# Patient Record
Sex: MALE | Race: WHITE | NOT HISPANIC OR LATINO | Employment: FULL TIME | URBAN - METROPOLITAN AREA
[De-identification: names, ages, dates, MRNs, and addresses within clinical notes are randomized per-mention and may not be internally consistent; named-entity substitution may affect disease eponyms.]

---

## 2019-02-06 ENCOUNTER — PATIENT MESSAGE (OUTPATIENT)
Dept: FAMILY MEDICINE | Facility: CLINIC | Age: 29
End: 2019-02-06

## 2019-02-06 ENCOUNTER — PATIENT MESSAGE (OUTPATIENT)
Dept: GASTROENTEROLOGY | Facility: CLINIC | Age: 29
End: 2019-02-06

## 2019-02-18 ENCOUNTER — LAB VISIT (OUTPATIENT)
Dept: LAB | Facility: HOSPITAL | Age: 29
End: 2019-02-18
Attending: NURSE PRACTITIONER
Payer: COMMERCIAL

## 2019-02-18 ENCOUNTER — OFFICE VISIT (OUTPATIENT)
Dept: GASTROENTEROLOGY | Facility: CLINIC | Age: 29
End: 2019-02-18
Payer: COMMERCIAL

## 2019-02-18 VITALS
SYSTOLIC BLOOD PRESSURE: 115 MMHG | WEIGHT: 144.19 LBS | DIASTOLIC BLOOD PRESSURE: 74 MMHG | HEART RATE: 64 BPM | BODY MASS INDEX: 23.17 KG/M2 | HEIGHT: 66 IN

## 2019-02-18 DIAGNOSIS — Z87.898 HISTORY OF ABDOMINAL PAIN: Primary | ICD-10-CM

## 2019-02-18 DIAGNOSIS — K51.00 PANCOLITIS: ICD-10-CM

## 2019-02-18 DIAGNOSIS — R10.9 ABDOMINAL PAIN, UNSPECIFIED ABDOMINAL LOCATION: ICD-10-CM

## 2019-02-18 PROCEDURE — 99203 PR OFFICE/OUTPT VISIT, NEW, LEVL III, 30-44 MIN: ICD-10-PCS | Mod: S$GLB,,, | Performed by: NURSE PRACTITIONER

## 2019-02-18 PROCEDURE — 99999 PR PBB SHADOW E&M-EST. PATIENT-LVL III: CPT | Mod: PBBFAC,,, | Performed by: NURSE PRACTITIONER

## 2019-02-18 PROCEDURE — 36415 COLL VENOUS BLD VENIPUNCTURE: CPT | Mod: PO

## 2019-02-18 PROCEDURE — 86140 C-REACTIVE PROTEIN: CPT

## 2019-02-18 PROCEDURE — 99203 OFFICE O/P NEW LOW 30 MIN: CPT | Mod: S$GLB,,, | Performed by: NURSE PRACTITIONER

## 2019-02-18 PROCEDURE — 99999 PR PBB SHADOW E&M-EST. PATIENT-LVL III: ICD-10-PCS | Mod: PBBFAC,,, | Performed by: NURSE PRACTITIONER

## 2019-02-18 NOTE — PROGRESS NOTES
"Subjective:       Patient ID: Macario Panchal is a 29 y.o. male Body mass index is 23.27 kg/m².    Chief Complaint: Follow-up (from ER visit)    This patient is new to me.    Patient is here with mother, whom assisted with history.      Abdominal Pain   This is a new problem. Episode onset: started 2/5/19, took 2 pepto bismuth tablets without relief. The onset quality is sudden. The most recent episode lasted 12 hours. The problem has been resolved (since the ER; has not had that pain again). The pain is located in the periumbilical region. The pain is at a severity of 0/10 (currently). Quality: described as aching pain when it was present. The abdominal pain does not radiate. Associated symptoms include belching. Pertinent negatives include no anorexia, constipation, diarrhea, dysuria, fever, flatus, frequency, hematochezia, melena, nausea, vomiting or weight loss. Associated symptoms comments: Bowel movements 2-4 times a day of soft pasty to formed stool; history of "nervous stomach" of when he eats he has a bowel movements within 10-30 minutes; fecal urgency. Nothing aggravates the pain. Treatments tried: bentyl 20 mg bid prn- helped; zofran prn- not taking currently. Prior diagnostic workup includes CT scan. There is no history of Crohn's disease, GERD or ulcerative colitis.     Review of Systems   Constitutional: Negative for appetite change, chills, fatigue, fever and weight loss.   HENT: Negative for sore throat and trouble swallowing.    Respiratory: Negative for cough, choking and shortness of breath.    Cardiovascular: Negative for chest pain.   Gastrointestinal: Negative for abdominal pain, anal bleeding, anorexia, blood in stool, constipation, diarrhea, flatus, hematochezia, melena, nausea, rectal pain and vomiting.   Genitourinary: Negative for difficulty urinating, dysuria, flank pain and frequency.   Neurological: Negative for weakness.       History reviewed. No pertinent past medical history.  History " reviewed. No pertinent surgical history.  Family History   Problem Relation Age of Onset    Anxiety disorder Mother     Anxiety disorder Father     Colon cancer Neg Hx     Colon polyps Neg Hx     Crohn's disease Neg Hx     Ulcerative colitis Neg Hx     Stomach cancer Neg Hx     Esophageal cancer Neg Hx      Wt Readings from Last 10 Encounters:   02/18/19 65.4 kg (144 lb 2.9 oz)   02/05/19 65.3 kg (143 lb 15.4 oz)   07/02/14 63.5 kg (140 lb 1.6 oz)   01/13/14 65.8 kg (145 lb)   11/19/13 64.4 kg (142 lb)   08/20/13 61.7 kg (136 lb)   08/01/12 64 kg (141 lb)   05/17/12 59.9 kg (132 lb)   12/08/11 63 kg (139 lb)   11/08/11 61.8 kg (136 lb 4.6 oz)     Lab Results   Component Value Date    WBC 10.19 02/05/2019    HGB 16.1 02/05/2019    HCT 46.9 02/05/2019    MCV 81 (L) 02/05/2019     02/05/2019     CMP  Sodium   Date Value Ref Range Status   02/05/2019 138 136 - 145 mmol/L Final     Potassium   Date Value Ref Range Status   02/05/2019 3.5 3.5 - 5.1 mmol/L Final     Chloride   Date Value Ref Range Status   02/05/2019 101 95 - 110 mmol/L Final     CO2   Date Value Ref Range Status   02/05/2019 29 22 - 31 mmol/L Final     Glucose   Date Value Ref Range Status   02/05/2019 104 70 - 110 mg/dL Final     Comment:     The ADA recommends the following guidelines for fasting glucose:  Normal:       less than 100 mg/dL  Prediabetes:  100 mg/dL to 125 mg/dL  Diabetes:     126 mg/dL or higher       BUN, Bld   Date Value Ref Range Status   02/05/2019 27 (H) 9 - 21 mg/dL Final     Creatinine   Date Value Ref Range Status   02/05/2019 0.83 0.50 - 1.40 mg/dL Final     Calcium   Date Value Ref Range Status   02/05/2019 10.1 8.4 - 10.2 mg/dL Final     Total Protein   Date Value Ref Range Status   02/05/2019 7.7 6.0 - 8.4 g/dL Final     Albumin   Date Value Ref Range Status   02/05/2019 4.7 3.5 - 5.2 g/dL Final     Total Bilirubin   Date Value Ref Range Status   02/05/2019 0.6 0.2 - 1.3 mg/dL Final     Alkaline Phosphatase    Date Value Ref Range Status   02/05/2019 64 38 - 145 U/L Final     AST   Date Value Ref Range Status   02/05/2019 28 17 - 59 U/L Final     ALT   Date Value Ref Range Status   02/05/2019 44 10 - 44 U/L Final     Anion Gap   Date Value Ref Range Status   02/05/2019 8 8 - 16 mmol/L Final     eGFR if    Date Value Ref Range Status   02/05/2019 >60 >60 mL/min/1.73 m^2 Final     eGFR if non    Date Value Ref Range Status   02/05/2019 >60 >60 mL/min/1.73 m^2 Final     Comment:     Calculation used to obtain the estimated glomerular filtration  rate (eGFR) is the CKD-EPI equation.        Lab Results   Component Value Date    LIPASERES 77 02/05/2019     Lab Results   Component Value Date    TSH 1.34 09/05/2009     Reviewed prior medical records including radiology report of 2/5/19 ct abdomen pelvis & ER visit note.    Objective:      Physical Exam   Constitutional: He is oriented to person, place, and time. He appears well-developed and well-nourished. No distress.   HENT:   Mouth/Throat: Oropharynx is clear and moist and mucous membranes are normal. No oral lesions. No oropharyngeal exudate.   Eyes: Conjunctivae are normal. Pupils are equal, round, and reactive to light. No scleral icterus.   Pulmonary/Chest: Effort normal and breath sounds normal. No respiratory distress. He has no wheezes.   Abdominal: Soft. Normal appearance and bowel sounds are normal. He exhibits no distension, no abdominal bruit and no mass. There is no tenderness. There is no rigidity, no rebound, no guarding, no tenderness at McBurney's point and negative Tobar's sign.   Neurological: He is alert and oriented to person, place, and time.   Skin: Skin is warm and dry. No rash noted. He is not diaphoretic. No erythema. No pallor.   Non-jaundiced   Psychiatric: He has a normal mood and affect. His behavior is normal. Judgment and thought content normal.   Nursing note and vitals reviewed.      Assessment:       1.  History of abdominal pain    2. Pancolitis        Plan:       History of abdominal pain  -     C-reactive protein; Future; Expected date: 02/18/2019  - schedule Colonoscopy, discussed procedure with the patient, patient verbalized understanding    Pancolitis  -     Stool Exam-Ova,Cysts,Parasites; Future; Expected date: 02/18/2019  -     Fecal fat, qualitative; Future; Expected date: 02/18/2019  -     Giardia / Cryptosporidum, EIA; Future; Expected date: 02/18/2019  -     Occult blood x 1, stool; Future; Expected date: 02/18/2019  -     pH, stool; Future; Expected date: 02/18/2019  -     Rotavirus antigen, stool; Future; Expected date: 02/18/2019  -     WBC, Stool; Future; Expected date: 02/18/2019  -     Calprotectin; Future; Expected date: 02/18/2019  -     Adenovirus Molecular Detection, PCR, Non-Blood Stool; Future; Expected date: 02/18/2019  -     Clostridium difficile EIA; Future; Expected date: 02/18/2019  -     Stool culture; Future; Expected date: 02/18/2019  -     C-reactive protein; Future; Expected date: 02/18/2019  - schedule Colonoscopy, discussed procedure with the patient, patient verbalized understanding    Follow-up in about 1 month (around 3/18/2019), or if symptoms worsen or fail to improve.      If no improvement in symptoms or symptoms worsen, call/follow-up at clinic or go to ER.

## 2019-02-19 ENCOUNTER — TELEPHONE (OUTPATIENT)
Dept: GASTROENTEROLOGY | Facility: CLINIC | Age: 29
End: 2019-02-19

## 2019-02-19 LAB — CRP SERPL-MCNC: 0.2 MG/L

## 2019-02-19 NOTE — TELEPHONE ENCOUNTER
----- Message from ANDREA Parikh sent at 2/19/2019  8:55 AM CST -----  Please call to inform & review the results with the patient- Lab results are normal: inflammatory marker was normal.   Continue with previous recommendations.  Thanks,  Winnie MENDEZ-C

## 2019-02-19 NOTE — TELEPHONE ENCOUNTER
----- Message from Albina Amos sent at 2/19/2019 10:06 AM CST -----  Contact: Caryl Panchal (Mother)  Type:  Patient Returning Call    Who Called:  Caryl Panchal (Mother)  Who Left Message for Patient:    Does the patient know what this is regarding?:    Best Call Back Number:  [phone]  Additional Information:

## 2019-02-19 NOTE — TELEPHONE ENCOUNTER
Spoke with pt mother and informed of pts results and recommendations per Mary Moy NP. Pt mother verbalized understanding and will inform pt.

## 2019-03-25 ENCOUNTER — ANESTHESIA (OUTPATIENT)
Dept: ENDOSCOPY | Facility: HOSPITAL | Age: 29
End: 2019-03-25
Payer: COMMERCIAL

## 2019-03-25 ENCOUNTER — HOSPITAL ENCOUNTER (OUTPATIENT)
Facility: HOSPITAL | Age: 29
Discharge: HOME | End: 2019-03-25
Attending: INTERNAL MEDICINE | Admitting: INTERNAL MEDICINE
Payer: COMMERCIAL

## 2019-03-25 ENCOUNTER — ANESTHESIA EVENT (OUTPATIENT)
Dept: ENDOSCOPY | Facility: HOSPITAL | Age: 29
End: 2019-03-25
Payer: COMMERCIAL

## 2019-03-25 VITALS
TEMPERATURE: 98 F | SYSTOLIC BLOOD PRESSURE: 101 MMHG | RESPIRATION RATE: 20 BRPM | OXYGEN SATURATION: 100 % | DIASTOLIC BLOOD PRESSURE: 59 MMHG | HEART RATE: 57 BPM

## 2019-03-25 DIAGNOSIS — R19.4 CHANGE IN BOWEL HABITS: ICD-10-CM

## 2019-03-25 PROCEDURE — D9220A PRA ANESTHESIA: Mod: ANES,,, | Performed by: ANESTHESIOLOGY

## 2019-03-25 PROCEDURE — D9220A PRA ANESTHESIA: ICD-10-PCS | Mod: CRNA,,, | Performed by: NURSE ANESTHETIST, CERTIFIED REGISTERED

## 2019-03-25 PROCEDURE — 25000003 PHARM REV CODE 250: Mod: PO | Performed by: INTERNAL MEDICINE

## 2019-03-25 PROCEDURE — 88305 TISSUE EXAM BY PATHOLOGIST: CPT | Performed by: PATHOLOGY

## 2019-03-25 PROCEDURE — 27201012 HC FORCEPS, HOT/COLD, DISP: Mod: PO | Performed by: INTERNAL MEDICINE

## 2019-03-25 PROCEDURE — 45380 COLONOSCOPY AND BIOPSY: CPT | Mod: ,,, | Performed by: INTERNAL MEDICINE

## 2019-03-25 PROCEDURE — 45380 PR COLONOSCOPY,BIOPSY: ICD-10-PCS | Mod: ,,, | Performed by: INTERNAL MEDICINE

## 2019-03-25 PROCEDURE — 45380 COLONOSCOPY AND BIOPSY: CPT | Mod: PO | Performed by: INTERNAL MEDICINE

## 2019-03-25 PROCEDURE — 37000008 HC ANESTHESIA 1ST 15 MINUTES: Mod: PO | Performed by: INTERNAL MEDICINE

## 2019-03-25 PROCEDURE — 63600175 PHARM REV CODE 636 W HCPCS: Mod: PO | Performed by: NURSE ANESTHETIST, CERTIFIED REGISTERED

## 2019-03-25 PROCEDURE — D9220A PRA ANESTHESIA: Mod: CRNA,,, | Performed by: NURSE ANESTHETIST, CERTIFIED REGISTERED

## 2019-03-25 PROCEDURE — 37000009 HC ANESTHESIA EA ADD 15 MINS: Mod: PO | Performed by: INTERNAL MEDICINE

## 2019-03-25 PROCEDURE — 88305 TISSUE SPECIMEN TO PATHOLOGY - SURGERY: ICD-10-PCS | Mod: 26,,, | Performed by: PATHOLOGY

## 2019-03-25 PROCEDURE — D9220A PRA ANESTHESIA: ICD-10-PCS | Mod: ANES,,, | Performed by: ANESTHESIOLOGY

## 2019-03-25 PROCEDURE — 88305 TISSUE EXAM BY PATHOLOGIST: CPT | Mod: 26,,, | Performed by: PATHOLOGY

## 2019-03-25 RX ORDER — SODIUM CHLORIDE, SODIUM LACTATE, POTASSIUM CHLORIDE, CALCIUM CHLORIDE 600; 310; 30; 20 MG/100ML; MG/100ML; MG/100ML; MG/100ML
INJECTION, SOLUTION INTRAVENOUS CONTINUOUS
Status: DISCONTINUED | OUTPATIENT
Start: 2019-03-25 | End: 2019-03-25 | Stop reason: HOSPADM

## 2019-03-25 RX ORDER — PROPOFOL 10 MG/ML
VIAL (ML) INTRAVENOUS
Status: DISCONTINUED | OUTPATIENT
Start: 2019-03-25 | End: 2019-03-25

## 2019-03-25 RX ORDER — LIDOCAINE HCL/PF 100 MG/5ML
SYRINGE (ML) INTRAVENOUS
Status: DISCONTINUED | OUTPATIENT
Start: 2019-03-25 | End: 2019-03-25

## 2019-03-25 RX ORDER — SODIUM CHLORIDE 0.9 % (FLUSH) 0.9 %
3 SYRINGE (ML) INJECTION
Status: DISCONTINUED | OUTPATIENT
Start: 2019-03-25 | End: 2019-03-25 | Stop reason: HOSPADM

## 2019-03-25 RX ADMIN — PROPOFOL 50 MG: 10 INJECTION, EMULSION INTRAVENOUS at 08:03

## 2019-03-25 RX ADMIN — PROPOFOL 125 MG: 10 INJECTION, EMULSION INTRAVENOUS at 08:03

## 2019-03-25 RX ADMIN — LIDOCAINE HYDROCHLORIDE 75 MG: 20 INJECTION, SOLUTION INTRAVENOUS at 08:03

## 2019-03-25 RX ADMIN — SODIUM CHLORIDE, SODIUM LACTATE, POTASSIUM CHLORIDE, AND CALCIUM CHLORIDE: .6; .31; .03; .02 INJECTION, SOLUTION INTRAVENOUS at 07:03

## 2019-03-25 RX ADMIN — PROPOFOL 25 MG: 10 INJECTION, EMULSION INTRAVENOUS at 08:03

## 2019-03-25 NOTE — H&P
History & Physical - Short Stay  Gastroenterology      SUBJECTIVE:     Procedure: Colonoscopy    Chief Complaint/Indication for Procedure: Change in Bowel Habits and Abnormal CT    PTA Medications   Medication Sig    ondansetron (ZOFRAN-ODT) 4 MG TbDL Take 1 tablet (4 mg total) by mouth every 6 (six) hours as needed.    alprazolam (XANAX) 0.5 MG tablet Take 1 tablet (0.5 mg total) by mouth nightly as needed.       Review of patient's allergies indicates:  No Known Allergies     Past Medical History:   Diagnosis Date    Anxiety      Past Surgical History:   Procedure Laterality Date    NASAL SEPTUM SURGERY       Family History   Problem Relation Age of Onset    Anxiety disorder Mother     Anxiety disorder Father     Colon cancer Neg Hx     Colon polyps Neg Hx     Crohn's disease Neg Hx     Ulcerative colitis Neg Hx     Stomach cancer Neg Hx     Esophageal cancer Neg Hx      Social History     Tobacco Use    Smoking status: Never Smoker    Smokeless tobacco: Never Used   Substance Use Topics    Alcohol use: Yes     Comment: occasional    Drug use: No         OBJECTIVE:     Vital Signs (Most Recent)  Temp: 97.9 °F (36.6 °C) (03/25/19 0749)  Pulse: (!) 55 (03/25/19 0749)  Resp: 16 (03/25/19 0749)  BP: (!) 116/59 (03/25/19 0749)  SpO2: 99 % (03/25/19 0749)    Physical Exam:                                                       GENERAL:  Comfortable, in no acute distress.                                 HEENT EXAM:  Nonicteric.  No adenopathy.  Oropharynx is clear.               NECK:  Supple.                                                               LUNGS:  Clear.                                                               CARDIAC:  Regular rate and rhythm.  S1, S2.  No murmur.                      ABDOMEN:  Soft, positive bowel sounds, nontender.  No hepatosplenomegaly or masses.  No rebound or guarding.                                             EXTREMITIES:  No edema.     MENTAL STATUS:  Normal,  alert and oriented.      ASSESSMENT/PLAN:     Assessment: Change in Bowel Habits and Abnormal CT    Plan: Colonoscopy    Anesthesia Plan: General    ASA Grade: ASA 2 - Patient with mild systemic disease with no functional limitations    MALLAMPATI SCORE:  I (soft palate, uvula, fauces, and tonsillar pillars visible)

## 2019-03-25 NOTE — [employer] PATIENT INSTRUCTIONS
Discharge Summary/Instructions after an Endoscopic Procedure  Patient Name: Macario Panchal  Patient MRN: [id number]  Patient YOB: 1990 Monday, March 25, 2019  Priyank Mendoza MD  RESTRICTIONS:  During your procedure today, you received medications for sedation.  These   medications may affect your judgment, balance and coordination.  Therefore,   for 24 hours, you have the following restrictions:   - DO NOT drive a car, operate machinery, make legal/financial decisions,   sign important papers or drink alcohol.    ACTIVITY:  Today: no heavy lifting, straining or running due to procedural   sedation/anesthesia.  The following day: return to full activity including work.  DIET:  Eat and drink normally unless instructed otherwise.     TREATMENT FOR COMMON SIDE EFFECTS:  - Mild abdominal pain, nausea, belching, bloating or excessive gas:  rest,   eat lightly and use a heating pad.  - Sore Throat: treat with throat lozenges and/or gargle with warm salt   water.  - Because air was used during the procedure, expelling large amounts of air   from your rectum or belching is normal.  - If a bowel prep was taken, you may not have a bowel movement for 1-3 days.    This is normal.  SYMPTOMS TO WATCH FOR AND REPORT TO YOUR PHYSICIAN:  1. Abdominal pain or bloating, other than gas cramps.  2. Chest pain.  3. Back pain.  4. Signs of infection such as: chills or fever occurring within 24 hours   after the procedure.  5. Rectal bleeding, which would show as bright red, maroon, or black stools.   (A tablespoon of blood from the rectum is not serious, especially if   hemorrhoids are present.)  6. Vomiting.  7. Weakness or dizziness.  GO DIRECTLY TO THE NEAREST EMERGENCY ROOM IF YOU HAVE ANY OF THE FOLLOWING:      Difficulty breathing              Chills and/or fever over 101 F   Persistent vomiting and/or vomiting blood   Severe abdominal pain   Severe chest pain   Black, tarry stools   Bleeding- more than one  tablespoon   Any other symptom or condition that you feel may need urgent attention  Your doctor recommends these additional instructions:  If any biopsies were taken, your doctors clinic will contact you in 1 to 2   weeks with any results.  We are waiting for your pathology results.   Your physician has recommended a repeat colonoscopy at age 50 (please   contact the clinic prior to your 50th birthday to schedule) for screening   purposes.   You are being discharged to home.  For questions, problems or results please call your physician - Priyank Mendoza MD at Work:  [phone].  EMERGENCY PHONE NUMBER: [phone], LAB RESULTS: [phone]  IF A COMPLICATION OR EMERGENCY SITUATION ARISES AND YOU ARE UNABLE TO REACH   YOUR PHYSICIAN - GO DIRECTLY TO THE EMERGENCY ROOM.  ___________________________________________  Nurse Signature  ___________________________________________  Patient/Designated Responsible Party Signature  Priyank Mendoaz MD  3/25/2019 8:54:59 AM  This report has been verified and signed electronically.  [employer]

## 2019-03-25 NOTE — TRANSFER OF CARE
Anesthesia Transfer of Care Note    Patient: Macario Panchal    Procedure(s) Performed: Procedure(s) (LRB):  COLONOSCOPY (N/A)    Patient location: PACU    Anesthesia Type: general    Transport from OR: Transported from OR on room air with adequate spontaneous ventilation    Post pain: adequate analgesia    Post assessment: no apparent anesthetic complications and tolerated procedure well    Post vital signs: stable    Level of consciousness: awake    Nausea/Vomiting: no nausea/vomiting    Complications: none    Transfer of care protocol was followed      Last vitals:   Visit Vitals  BP (!) 116/59 (BP Location: Right arm)   Pulse (!) 55   Temp 36.6 °C (97.9 °F) (Oral)   Resp 16   SpO2 99%

## 2019-03-25 NOTE — DISCHARGE SUMMARY
Discharge Note  Short Stay      SUMMARY     Admit Date: 3/25/2019    Attending Physician: Priyank Mendoza MD     Discharge Physician: Priyank Mendoza MD    Discharge Date: 3/25/2019 8:55 AM    Final Diagnosis: Pancolitis [K51.00]  Loose stools [R19.5]  Generalized abdominal pain [R10.84]    Disposition: HOME OR SELF CARE    Patient Instructions:   Current Discharge Medication List      CONTINUE these medications which have NOT CHANGED    Details   ondansetron (ZOFRAN-ODT) 4 MG TbDL Take 1 tablet (4 mg total) by mouth every 6 (six) hours as needed.  Qty: 12 tablet, Refills: 0      alprazolam (XANAX) 0.5 MG tablet Take 1 tablet (0.5 mg total) by mouth nightly as needed.  Qty: 30 tablet, Refills: 3    Associated Diagnoses: Anxiety             Discharge Procedure Orders (must include Diet, Follow-up, Activity)    Follow Up:  Follow up with PCP as previously scheduled  Resume routine diet.  Activity as tolerated.    No driving day of procedure.

## 2019-03-25 NOTE — ANESTHESIA PREPROCEDURE EVALUATION
03/25/2019  Macario Panchal is a 29 y.o., male.    Anesthesia Evaluation    I have reviewed the Patient Summary Reports.    I have reviewed the Nursing Notes.   I have reviewed the Medications.     Review of Systems  Anesthesia Hx:  No problems with previous Anesthesia    Social:  Non-Smoker    Cardiovascular:  Cardiovascular Normal     Pulmonary:  Pulmonary Normal    Renal/:  Renal/ Normal     Neurological:  Neurology Normal    Endocrine:  Endocrine Normal    Psych:   Psychiatric History anxiety          Physical Exam  General:  Well nourished    Airway/Jaw/Neck:  Airway Findings: Mouth Opening: Normal Tongue: Normal  General Airway Assessment: Adult  Oropharynx Findings:  Mallampati: II  Jaw/Neck Findings:  Neck ROM: Normal ROM     Eyes/Ears/Nose:  Eyes/Ears/Nose Findings:    Dental:  Dental Findings:   Chest/Lungs:  Chest/Lungs Findings: Normal Respiratory Rate     Heart/Vascular:  Heart Findings: Rate: Normal  Rhythm: Regular Rhythm        Mental Status:  Mental Status Findings:  Cooperative, Alert and Oriented         Anesthesia Plan  Type of Anesthesia, risks & benefits discussed:  Anesthesia Type:  general  Patient's Preference:   Intra-op Monitoring Plan: standard ASA monitors  Intra-op Monitoring Plan Comments:   Post Op Pain Control Plan: multimodal analgesia  Post Op Pain Control Plan Comments:   Induction:   IV  Beta Blocker:  Patient is not currently on a Beta-Blocker (No further documentation required).       Informed Consent: Patient understands risks and agrees with Anesthesia plan.  Questions answered. Anesthesia consent signed with patient.  ASA Score: 2     Day of Surgery Review of History & Physical:  There are no significant changes.   H&P completed by Anesthesiologist.       Ready For Surgery From Anesthesia Perspective.

## 2019-03-25 NOTE — ANESTHESIA POSTPROCEDURE EVALUATION
Anesthesia Post Evaluation    Patient: Macario Panchal    Procedure(s) Performed: Procedure(s) (LRB):  COLONOSCOPY (N/A)    Final Anesthesia Type: general  Patient location during evaluation: PACU  Patient participation: Yes- Able to Participate  Level of consciousness: awake and alert and oriented  Post-procedure vital signs: reviewed and stable  Pain management: adequate  Airway patency: patent  PONV status at discharge: No PONV  Anesthetic complications: no      Cardiovascular status: blood pressure returned to baseline and stable  Respiratory status: unassisted and spontaneous ventilation  Hydration status: euvolemic  Follow-up not needed.          Vitals Value Taken Time   BP 96/53 3/25/2019  8:56 AM   Temp 36.7 °C (98.1 °F) 3/25/2019  8:56 AM   Pulse 59 3/25/2019  8:56 AM   Resp 16 3/25/2019  8:56 AM   SpO2   3/25/2019  9:55 AM         No case tracking events are documented in the log.      Pain/Pam Score: No data recorded

## 2020-10-08 ENCOUNTER — OFFICE VISIT (OUTPATIENT)
Dept: GASTROENTEROLOGY | Facility: CLINIC | Age: 30
End: 2020-10-08

## 2020-10-08 ENCOUNTER — LAB VISIT (OUTPATIENT)
Dept: LAB | Facility: HOSPITAL | Age: 30
End: 2020-10-08
Attending: NURSE PRACTITIONER

## 2020-10-08 VITALS — WEIGHT: 146.19 LBS | HEIGHT: 66 IN | BODY MASS INDEX: 23.49 KG/M2

## 2020-10-08 DIAGNOSIS — R19.7 DIARRHEA, UNSPECIFIED TYPE: ICD-10-CM

## 2020-10-08 DIAGNOSIS — Z86.59 HISTORY OF ANXIETY: ICD-10-CM

## 2020-10-08 DIAGNOSIS — R15.2 FECAL URGENCY: ICD-10-CM

## 2020-10-08 DIAGNOSIS — R19.7 INTERMITTENT DIARRHEA: Primary | ICD-10-CM

## 2020-10-08 PROCEDURE — 36415 COLL VENOUS BLD VENIPUNCTURE: CPT | Mod: PO

## 2020-10-08 PROCEDURE — 99999 PR PBB SHADOW E&M-EST. PATIENT-LVL III: CPT | Mod: PBBFAC,,, | Performed by: NURSE PRACTITIONER

## 2020-10-08 PROCEDURE — 99214 OFFICE O/P EST MOD 30 MIN: CPT | Mod: S$GLB,,, | Performed by: NURSE PRACTITIONER

## 2020-10-08 PROCEDURE — 99999 PR PBB SHADOW E&M-EST. PATIENT-LVL III: ICD-10-PCS | Mod: PBBFAC,,, | Performed by: NURSE PRACTITIONER

## 2020-10-08 PROCEDURE — 82784 ASSAY IGA/IGD/IGG/IGM EACH: CPT

## 2020-10-08 PROCEDURE — 83516 IMMUNOASSAY NONANTIBODY: CPT

## 2020-10-08 PROCEDURE — 99214 PR OFFICE/OUTPT VISIT, EST, LEVL IV, 30-39 MIN: ICD-10-PCS | Mod: S$GLB,,, | Performed by: NURSE PRACTITIONER

## 2020-10-08 RX ORDER — DICYCLOMINE HYDROCHLORIDE 10 MG/1
10 CAPSULE ORAL
Qty: 120 CAPSULE | Refills: 0 | Status: SHIPPED | OUTPATIENT
Start: 2020-10-08 | End: 2020-11-06 | Stop reason: SDUPTHER

## 2020-10-08 NOTE — PATIENT INSTRUCTIONS
Uncertain Causes of Diarrhea (Adult)    Diarrhea is when stools are loose and watery. This can be caused by:  · Viral infections  · Bacterial infections  · Food poisoning  · Parasites  · Irritable bowel syndrome (IBS)  · Inflammatory bowel diseases such as ulcerative colitis, Crohn's disease, and celiac disease  · Food intolerance, such as to lactose, the sugar found in milk and milk products  · Reaction to medicines like antibiotics, laxatives, cancer drugs, and antacids  Along with diarrhea, you may also have:  · Abdominal pain and cramping  · Nausea and vomiting  · Loss of bowel control  · Fever and chills  · Bloody stools  In some cases, antibiotics may help to treat diarrhea. You may have a stool sample test. This is done to see what is causing your diarrhea, and if antibiotics will help treat it. The results of a stool sample test may take up to 2 days. The healthcare provider may not give you antibiotics until he or she has the stool test results.  Diarrhea can cause dehydration. This is the loss of too much water and other fluids from the body. When this occurs, body fluid must be replaced. This can be done with oral rehydration solutions. Oral rehydration solutions are available at drugstores and grocery stores without a prescription.  Home care  Follow all instructions given by your healthcare provider. Rest at home for the next 24 hours, or until you feel better. Avoid caffeine, tobacco, and alcohol. These can make diarrhea, cramping, and pain worse.  If taking medicines:  · Dont take over-the-counter diarrhea or nausea medicines unless your healthcare provider tells you to.  · You may use acetaminophen or NSAID medicines like ibuprofen or naproxen to reduce pain and fever. Dont use these if you have chronic liver or kidney disease, or ever had a stomach ulcer or gastrointestinal bleeding. Don't use NSAID medicines if you are already taking one for another condition (like arthritis) or are on daily  aspirin therapy (such as for heart disease or after a stroke). Talk with your healthcare provider first.  · If antibiotics were prescribed, be sure you take them until they are finished. Dont stop taking them even when you feel better. Antibiotics must be taken as a full course.  To prevent the spread of illness:  · Remember that washing with soap and water and using alcohol-based  is the best way to prevent the spread of infection.  · Clean the toilet after each use.  · Wash your hands before eating.  · Wash your hands before and after preparing food. Keep in mind that people with diarrhea or vomiting should not prepare food for others.  · Wash your hands after using cutting boards, countertops, and knives that have been in contact with raw foods.  · Wash and then peel fruits and vegetables.  · Keep uncooked meats away from cooked and ready-to-eat foods.  · Use a food thermometer when cooking. Cook poultry to at least 165°F (74°C). Cook ground meat (beef, veal, pork, lamb) to at least 160°F (71°C). Cook fresh beef, veal, lamb, and pork to at least 145°F (63°C).  · Dont eat raw or undercooked eggs (poached or didi side up), poultry, meat, or unpasteurized milk and juices.  Food and drinks  The main goal while treating vomiting or diarrhea is to prevent dehydration. This is done by taking small amounts of liquids often.  · Keep in mind that liquids are more important than food right now.  · Drink only small amounts of liquids at a time.  · Dont force yourself to eat, especially if you are having cramping, vomiting, or diarrhea. Dont eat large amounts at a time, even if you are hungry.  · If you eat, avoid fatty, greasy, spicy, or fried foods.  · Dont eat dairy foods or drink milk if you have diarrhea. These can make diarrhea worse.  During the first 24 hours you can try:  · Oral rehydration solutions. Do not use sports drinks. They have too much sugar and not enough electrolytes.  · Soft drinks without  caffeine  · Ginger ale  · Water (plain or flavored)  · Decaf tea or coffee  · Clear broth, consommé, or bouillon  · Gelatin, popsicles, or frozen fruit juice bars  The second 24 hours, if you are feeling better, you can add:  · Hot cereal, plain toast, bread, rolls, or crackers  · Plain noodles, rice, mashed potatoes, chicken noodle soup, or rice soup  · Unsweetened canned fruit (no pineapple)  · Bananas  As you recover:  · Limit fat intake to less than 15 grams per day. Dont eat margarine, butter, oils, mayonnaise, sauces, gravies, fried foods, peanut butter, meat, poultry, or fish.  · Limit fiber. Dont eat raw or cooked vegetables, fresh fruits except bananas, or bran cereals.  · Limit caffeine and chocolate.  · Limit dairy.  · Dont use spices or seasonings except salt.  · Go back to your normal diet over time, as you feel better and your symptoms improve.  · If the symptoms come back, go back to a simple diet or clear liquids.  Follow-up care  Follow up with your healthcare provider, or as advised. If a stool sample was taken or cultures were done, call the healthcare provider for the results as instructed.  Call 911  Call 911 if you have any of these symptoms:  · Trouble breathing  · Confusion  · Extreme drowsiness or trouble walking  · Loss of consciousness  · Rapid heart rate  · Chest pain  · Stiff neck  · Seizure  When to seek medical advice  Call your healthcare provider right away if any of these occur:  · Abdominal pain that gets worse  · Constant lower right abdominal pain  · Continued vomiting and inability to keep liquids down  · Diarrhea more than 5 times a day  · Blood in vomit or stool  · Dark urine or no urine for 8 hours, dry mouth and tongue, tiredness, weakness, or dizziness  · Drowsiness  · New rash  · You dont get better in 2 to 3 days  · Fever of 100.4°F (38°C) or higher that doesnt get lower with medicine  Date Last Reviewed: 1/3/2016  © 8794-3136 [employer]. [address] [place] [address]  [place] [address]. All rights reserved. This information is not intended as a substitute for professional medical care. Always follow your healthcare professional's instructions.          Irritable Bowel Syndrome    Irritable bowel syndrome (IBS) is a disorder of the intestines. It is not a disease, but a group of symptoms caused by changes in the way the intestines work. It is fairly common, but the cause is not well understood.  Symptoms of IBS include:  · Abdominal pain, discomfort, and cramping  · Diarrhea  · Constipation or dry, hard stools  · Mucous stool  · Bloating  · Feeling of incomplete bowel movements  It usually results in one of 3 patterns of symptoms:  · Chronic abdominal pain and constipation  · Recurring episodes of diarrhea, with or without pain  · Alternating diarrhea and constipation  Home care  The goal of treatment is to control and relieve your symptoms, so you can lead a full and active life. There is no cure for IBS. But it can be managed.  Diet  Your diet did not cause your IBS, but it can affect it. No one diet works for everyone. Finding the best foods for you may take trial and error. Keep a food log to help find what foods made your symptoms worse. Below are some tips that may help you.  · Eat more slowly. Eat smaller amounts at a time, but more often. Remember, you can always eat more, but cannot eat less once youve eaten too much.  · High-fiber foods are complicated. While they may help relieve constipation, they can make your bloating, cramping, gas, and diarrhea worse.  · Eat less sugar.  · Try cutting out dairy products. Sometimes this helps.  · Try cutting out foods that are high in fat and fatty meats.  · You can control bloating or passing excess gas. Be careful with gassy vegetables and fruits like beans, cabbage, broccoli, and cauliflower.  · Be careful with carbonated beverages and fruit juices. They can make your bloating and diarrhea worse.  · Caffeine,  alcohol, and stimulants can make symptoms worse. These include coffee, tea, sodas, energy drinks, and chocolate.  Lifestyle  · Look for factors that seem to worsen your symptoms. These include stress and emotions.   · Although stress does not cause IBS, it may trigger flare-ups. Counseling can help you learn to handle stress. So can self-help measures like exercise, yoga, and meditation.  · Depression can occur along with IBS. Your healthcare provider may prescribe antidepressant medicine. This may help with diarrhea, constipation, and cramping, as well as with symptoms of depression.  · Smoking doesn't cause IBS, but can make the symptoms worse.  Medicines  Your healthcare provider may prescribe medicines. Take them as directed. For acute flare-ups of your illness, your provider may give you prescription medicines.  · Check with your healthcare provider before taking any medicines for diarrhea.  · Avoid anti-inflammatory medicines like ibuprofen or naproxen.  · Consider nutritional supplements. This is especially true if your diarrhea is prolonged, or you aren't eating or are losing weight  Follow-up care  Follow up with your healthcare provider, or as advised. If a stool sample was taken or cultures were done, you will be told if your treatment needs to change. You can call as directed for the results.  When to seek medical advice  Call your healthcare provider right away if any of these occur:  · Abdominal pain gets worse  · Constant abdominal pain moves to the right-lower abdomen  · You can't keep liquids down because of vomiting  · You have severe diarrhea  · You have blood (red or black color) or mucus in your stool  · You feel very weak or dizzy, faint, or have extreme thirst  · You have a fever of 100.4ºF (38.0ºC) or higher, or as directed by your healthcare provider  Date Last Reviewed: 8/31/2015  © 0019-2596 [employer]. [address] [place] [address], [place] [address]. All rights reserved. This  information is not intended as a substitute for professional medical care. Always follow your healthcare professional's instructions.

## 2020-10-08 NOTE — PROGRESS NOTES
Subjective:       Patient ID: [id number] Panchal is a 30 y.o. male Body mass index is 23.59 kg/m².    Chief Complaint: Follow-up    This patient is established with Dr. Mendoza & myself.    Diarrhea   Episode onset: patient reports he has always had stomach issues since childhood but worse since COVID pandemic ~3/2020, thinks due to increased stress. Episode frequency: intermittent, occurs a couple of times a week with fecal urgency of stool rated 6 on bristol stool scale; otherwise, bowel movements are once daily of formed stool. The problem has been gradually worsening (since ~3/2020). The patient states that diarrhea does not awaken him from sleep. Associated symptoms include bloating and increased flatus. Pertinent negatives include no abdominal pain, chills, coughing, fever, vomiting or weight loss. The symptoms are aggravated by stress (worse when at work, reports he is typically more stressed at work, fried foods). There are no known risk factors (denies recent antibiotic/hospitalization, foreign travel, ill contacts, or suspect food intake). Treatments tried: probiotic- no relief. There is no history of bowel resection or inflammatory bowel disease.     Review of Systems   Constitutional: Negative for appetite change, chills, fatigue, fever and weight loss.        Eats 6 meals a day of healthy food   HENT: Negative for sore throat and trouble swallowing.    Respiratory: Negative for cough, choking and shortness of breath.    Cardiovascular: Negative for chest pain.   Gastrointestinal: Positive for bloating, diarrhea and flatus. Negative for abdominal pain, anal bleeding, blood in stool, constipation, nausea, rectal pain and vomiting.   Genitourinary: Negative for difficulty urinating and flank pain.   Neurological: Negative for weakness.       Past Medical History:   Diagnosis Date    Anxiety      Past Surgical History:   Procedure Laterality Date    COLONOSCOPY N/A 3/25/2019    Procedure: COLONOSCOPY;   Surgeon: Priyank Mendoza MD;  Location: [facility];  Service: Endoscopy;  Laterality: N/A; prep was fair, unremarkable; repeat at 50 years old for screening; biopsy: random biopsies- Colonic mucosa with no significant histopathologic findings.    NASAL SEPTUM SURGERY       Family History   Problem Relation Age of Onset    Anxiety disorder Mother     Anxiety disorder Father     Colon cancer Neg Hx     Colon polyps Neg Hx     Crohn's disease Neg Hx     Ulcerative colitis Neg Hx     Stomach cancer Neg Hx     Esophageal cancer Neg Hx      Wt Readings from Last 10 Encounters:   10/08/20 66.3 kg (146 lb 2.6 oz)   02/18/19 65.4 kg (144 lb 2.9 oz)   02/05/19 65.3 kg (143 lb 15.4 oz)   07/02/14 63.5 kg (140 lb 1.6 oz)   01/13/14 65.8 kg (145 lb)   11/19/13 64.4 kg (142 lb)   08/20/13 61.7 kg (136 lb)   08/01/12 64 kg (141 lb)   05/17/12 59.9 kg (132 lb)   12/08/11 63 kg (139 lb)     Lab Results   Component Value Date    WBC 10.19 02/05/2019    HGB 16.1 02/05/2019    HCT 46.9 02/05/2019    MCV 81 (L) 02/05/2019     02/05/2019     CMP  Sodium   Date Value Ref Range Status   02/05/2019 138 136 - 145 mmol/L Final     Potassium   Date Value Ref Range Status   02/05/2019 3.5 3.5 - 5.1 mmol/L Final     Chloride   Date Value Ref Range Status   02/05/2019 101 95 - 110 mmol/L Final     CO2   Date Value Ref Range Status   02/05/2019 29 22 - 31 mmol/L Final     Glucose   Date Value Ref Range Status   02/05/2019 104 70 - 110 mg/dL Final     Comment:     The ADA recommends the following guidelines for fasting glucose:  Normal:       less than 100 mg/dL  Prediabetes:  100 mg/dL to 125 mg/dL  Diabetes:     126 mg/dL or higher       BUN, Bld   Date Value Ref Range Status   02/05/2019 27 (H) 9 - 21 mg/dL Final     Creatinine   Date Value Ref Range Status   02/05/2019 0.83 0.50 - 1.40 mg/dL Final     Calcium   Date Value Ref Range Status   02/05/2019 10.1 8.4 - 10.2 mg/dL Final     Total Protein   Date Value Ref Range Status    02/05/2019 7.7 6.0 - 8.4 g/dL Final     Albumin   Date Value Ref Range Status   02/05/2019 4.7 3.5 - 5.2 g/dL Final     Total Bilirubin   Date Value Ref Range Status   02/05/2019 0.6 0.2 - 1.3 mg/dL Final     Alkaline Phosphatase   Date Value Ref Range Status   02/05/2019 64 38 - 145 U/L Final     AST   Date Value Ref Range Status   02/05/2019 28 17 - 59 U/L Final     ALT   Date Value Ref Range Status   02/05/2019 44 10 - 44 U/L Final     Anion Gap   Date Value Ref Range Status   02/05/2019 8 8 - 16 mmol/L Final     eGFR if    Date Value Ref Range Status   02/05/2019 >60 >60 mL/min/1.73 m^2 Final     eGFR if non    Date Value Ref Range Status   02/05/2019 >60 >60 mL/min/1.73 m^2 Final     Comment:     Calculation used to obtain the estimated glomerular filtration  rate (eGFR) is the CKD-EPI equation.        Lab Results   Component Value Date    LIPASERES 77 02/05/2019     Lab Results   Component Value Date    TSH 1.34 09/05/2009     Lab Results   Component Value Date    CRP 0.2 02/18/2019     Reviewed prior medical records including radiology report of 2/5/19 ct abdomen pelvis & ER visit note.    Objective:      Physical Exam  Vitals signs and nursing note reviewed.   Constitutional:       General: He is not in acute distress.     Appearance: Normal appearance. He is well-developed. He is not diaphoretic.   HENT:      Mouth/Throat:      Comments: Patient is wearing a face mask, which covers patient's mouth and nose, due to COVID 19 concerns.  Eyes:      General: No scleral icterus.     Conjunctiva/sclera: Conjunctivae normal.      Pupils: Pupils are equal, round, and reactive to light.   Pulmonary:      Effort: Pulmonary effort is normal. No respiratory distress.      Breath sounds: Normal breath sounds. No wheezing.   Abdominal:      General: Bowel sounds are normal. There is no distension or abdominal bruit.      Palpations: Abdomen is soft. Abdomen is not rigid. There is no mass.       Tenderness: There is no abdominal tenderness. There is no guarding or rebound. Negative signs include Tobar's sign and McBurney's sign.   Skin:     General: Skin is warm and dry.      Coloration: Skin is not pale.      Findings: No erythema or rash.      Comments: Non-jaundiced   Neurological:      Mental Status: He is alert and oriented to person, place, and time.   Psychiatric:         Behavior: Behavior normal.         Thought Content: Thought content normal.         Judgment: Judgment normal.         Assessment:       1. Intermittent diarrhea    2. Fecal urgency    3. History of anxiety        Plan:       Diarrhea, unspecified type & Fecal urgency  -     Pancreatic elastase, fecal; Future; Expected date: 10/08/2020  -     Stool Exam-Ova,Cysts,Parasites; Future; Expected date: 10/08/2020  -     Fecal fat, qualitative; Future; Expected date: 10/08/2020  -     Giardia / Cryptosporidum, EIA; Future; Expected date: 10/08/2020  -     Occult blood x 1, stool; Future; Expected date: 10/08/2020  -     pH, stool; Future; Expected date: 10/08/2020  -     Rotavirus antigen, stool; Future; Expected date: 10/08/2020  -     WBC, Stool; Future; Expected date: 10/08/2020  -     Stool culture; Future; Expected date: 10/08/2020  -     Clostridium difficile EIA; Future; Expected date: 10/08/2020  -     Adenovirus Molecular Detection, PCR, Non-Blood Stool; Future; Expected date: 10/08/2020  -     Tissue Transglutaminase, IgA; Future; Expected date: 10/08/2020  -     IgA; Future; Expected date: 10/08/2020  -   START  dicyclomine (BENTYL) 10 MG capsule; Take 1 capsule (10 mg total) by mouth 4 (four) times daily before meals and nightly.  Dispense: 120 capsule; Refill: 0  - recommended increase fiber in diet, especially soluble fiber since this can help bulk up the stool consistency and may help to slow down how fast the stool goes through the colon and can prevent diarrhea; Recommend high fiber diet (20-30 grams of fiber  daily)/OTC fiber supplements daily as directed, such as Benefiber.    History of anxiety  Recommend follow-up with Primary Care Provider/psych for continued evaluation and management.  - Discussed about trial of librax, patient verbalized understanding but patient declined prescription for librax at this time and patient wants to try bentyl at this time.    Follow up in about 1 month (around 11/8/2020), or if symptoms worsen or fail to improve.      If no improvement in symptoms or symptoms worsen, call/follow-up at clinic or go to ER.

## 2020-10-09 LAB — IGA SERPL-MCNC: 114 MG/DL (ref 40–350)

## 2020-10-12 LAB — TTG IGA SER-ACNC: 4 UNITS

## 2020-11-06 ENCOUNTER — OFFICE VISIT (OUTPATIENT)
Dept: GASTROENTEROLOGY | Facility: CLINIC | Age: 30
End: 2020-11-06
Payer: COMMERCIAL

## 2020-11-06 VITALS — BODY MASS INDEX: 23.35 KG/M2 | WEIGHT: 145.31 LBS | HEIGHT: 66 IN

## 2020-11-06 DIAGNOSIS — Z86.59 HISTORY OF ANXIETY: ICD-10-CM

## 2020-11-06 DIAGNOSIS — R19.7 INTERMITTENT DIARRHEA: Primary | ICD-10-CM

## 2020-11-06 PROCEDURE — 99214 OFFICE O/P EST MOD 30 MIN: CPT | Mod: S$PBB,,, | Performed by: NURSE PRACTITIONER

## 2020-11-06 PROCEDURE — 99214 PR OFFICE/OUTPT VISIT, EST, LEVL IV, 30-39 MIN: ICD-10-PCS | Mod: S$PBB,,, | Performed by: NURSE PRACTITIONER

## 2020-11-06 PROCEDURE — 99999 PR PBB SHADOW E&M-EST. PATIENT-LVL III: CPT | Mod: PBBFAC,,, | Performed by: NURSE PRACTITIONER

## 2020-11-06 PROCEDURE — 99999 PR PBB SHADOW E&M-EST. PATIENT-LVL III: ICD-10-PCS | Mod: PBBFAC,,, | Performed by: NURSE PRACTITIONER

## 2020-11-06 RX ORDER — DICYCLOMINE HYDROCHLORIDE 10 MG/1
10 CAPSULE ORAL
Qty: 120 CAPSULE | Refills: 3 | Status: SHIPPED | OUTPATIENT
Start: 2020-11-06 | End: 2021-11-06

## 2020-11-06 NOTE — PROGRESS NOTES
Subjective:       Patient ID: Macario Panchal is a 30 y.o. male Body mass index is 23.45 kg/m².    Chief Complaint: Follow-up    This patient is established with Dr. Mendoza & myself.    Diarrhea   Episode onset: patient reports he has always had stomach issues since childhood but worse since COVID pandemic ~3/2020, thinks due to increased stress. Episode frequency: fecal urgency has resolved; otherwise, bowel movements are once daily of formed stool, occasional gets mild intermittent diarrhea. The problem has been rapidly improving (since taking bentyl). The patient states that diarrhea does not awaken him from sleep. Associated symptoms include bloating (improving) and increased flatus (improving). Pertinent negatives include no abdominal pain, chills, coughing, fever, vomiting or weight loss. The symptoms are aggravated by stress (worse when at work, reports he is typically more stressed at work, fried foods). There are no known risk factors (denies recent antibiotic/hospitalization, foreign travel, ill contacts, or suspect food intake). Treatments tried: bentyl 10 mg QID- significant relief; PAST: probiotic- no relief. The treatment provided significant relief. There is no history of bowel resection or inflammatory bowel disease.     Review of Systems   Constitutional: Negative for appetite change, chills, fatigue, fever and weight loss.        Eats 6 meals a day of healthy food   HENT: Negative for sore throat and trouble swallowing.    Respiratory: Negative for cough, choking and shortness of breath.    Cardiovascular: Negative for chest pain.   Gastrointestinal: Positive for bloating (improving), diarrhea and flatus (improving). Negative for abdominal pain, anal bleeding, blood in stool, constipation, nausea, rectal pain and vomiting.   Genitourinary: Negative for difficulty urinating and flank pain.   Neurological: Negative for weakness.       Past Medical History:   Diagnosis Date    Anxiety      Past  Surgical History:   Procedure Laterality Date    COLONOSCOPY N/A 3/25/2019    Procedure: COLONOSCOPY;  Surgeon: Priyank Mendoza MD;  Location: [facility];  Service: Endoscopy;  Laterality: N/A; prep was fair, unremarkable; repeat at 50 years old for screening; biopsy: random biopsies- Colonic mucosa with no significant histopathologic findings.    NASAL SEPTUM SURGERY       Family History   Problem Relation Age of Onset    Anxiety disorder Mother     Anxiety disorder Father     Colon cancer Neg Hx     Colon polyps Neg Hx     Crohn's disease Neg Hx     Ulcerative colitis Neg Hx     Stomach cancer Neg Hx     Esophageal cancer Neg Hx      Wt Readings from Last 10 Encounters:   11/06/20 65.9 kg (145 lb 4.5 oz)   10/08/20 66.3 kg (146 lb 2.6 oz)   02/18/19 65.4 kg (144 lb 2.9 oz)   02/05/19 65.3 kg (143 lb 15.4 oz)   07/02/14 63.5 kg (140 lb 1.6 oz)   01/13/14 65.8 kg (145 lb)   11/19/13 64.4 kg (142 lb)   08/20/13 61.7 kg (136 lb)   08/01/12 64 kg (141 lb)   05/17/12 59.9 kg (132 lb)     Lab Results   Component Value Date    WBC 10.19 02/05/2019    HGB 16.1 02/05/2019    HCT 46.9 02/05/2019    MCV 81 (L) 02/05/2019     02/05/2019     CMP  Sodium   Date Value Ref Range Status   02/05/2019 138 136 - 145 mmol/L Final     Potassium   Date Value Ref Range Status   02/05/2019 3.5 3.5 - 5.1 mmol/L Final     Chloride   Date Value Ref Range Status   02/05/2019 101 95 - 110 mmol/L Final     CO2   Date Value Ref Range Status   02/05/2019 29 22 - 31 mmol/L Final     Glucose   Date Value Ref Range Status   02/05/2019 104 70 - 110 mg/dL Final     Comment:     The ADA recommends the following guidelines for fasting glucose:  Normal:       less than 100 mg/dL  Prediabetes:  100 mg/dL to 125 mg/dL  Diabetes:     126 mg/dL or higher       BUN   Date Value Ref Range Status   02/05/2019 27 (H) 9 - 21 mg/dL Final     Creatinine   Date Value Ref Range Status   02/05/2019 0.83 0.50 - 1.40 mg/dL Final     Calcium   Date  Value Ref Range Status   02/05/2019 10.1 8.4 - 10.2 mg/dL Final     Total Protein   Date Value Ref Range Status   02/05/2019 7.7 6.0 - 8.4 g/dL Final     Albumin   Date Value Ref Range Status   02/05/2019 4.7 3.5 - 5.2 g/dL Final     Total Bilirubin   Date Value Ref Range Status   02/05/2019 0.6 0.2 - 1.3 mg/dL Final     Alkaline Phosphatase   Date Value Ref Range Status   02/05/2019 64 38 - 145 U/L Final     AST   Date Value Ref Range Status   02/05/2019 28 17 - 59 U/L Final     ALT   Date Value Ref Range Status   02/05/2019 44 10 - 44 U/L Final     Anion Gap   Date Value Ref Range Status   02/05/2019 8 8 - 16 mmol/L Final     eGFR if    Date Value Ref Range Status   02/05/2019 >60 >60 mL/min/1.73 m^2 Final     eGFR if non    Date Value Ref Range Status   02/05/2019 >60 >60 mL/min/1.73 m^2 Final     Comment:     Calculation used to obtain the estimated glomerular filtration  rate (eGFR) is the CKD-EPI equation.        Lab Results   Component Value Date    LIPASERES 77 02/05/2019     Lab Results   Component Value Date    TSH 1.34 09/05/2009     Lab Results   Component Value Date    CRP 0.2 02/18/2019     10/8/2020 celiac serology WNL    Reviewed prior medical records including radiology report of 2/5/19 ct abdomen pelvis & ER visit note.    Objective:      Physical Exam  Vitals signs and nursing note reviewed.   Constitutional:       General: He is not in acute distress.     Appearance: Normal appearance. He is well-developed. He is not diaphoretic.   HENT:      Mouth/Throat:      Comments: Patient is wearing a face mask, which covers patient's mouth and nose, due to COVID 19 concerns.  Eyes:      General: No scleral icterus.     Conjunctiva/sclera: Conjunctivae normal.      Pupils: Pupils are equal, round, and reactive to light.   Pulmonary:      Effort: Pulmonary effort is normal. No respiratory distress.      Breath sounds: Normal breath sounds. No wheezing.   Abdominal:       General: Bowel sounds are normal. There is no distension or abdominal bruit.      Palpations: Abdomen is soft. Abdomen is not rigid. There is no mass.      Tenderness: There is no abdominal tenderness. There is no guarding or rebound. Negative signs include Tobar's sign and McBurney's sign.   Skin:     General: Skin is warm and dry.      Coloration: Skin is not pale.      Findings: No erythema or rash.      Comments: Non-jaundiced   Neurological:      Mental Status: He is alert and oriented to person, place, and time.   Psychiatric:         Behavior: Behavior normal.         Thought Content: Thought content normal.         Judgment: Judgment normal.         Assessment:       1. Intermittent diarrhea    2. History of anxiety        Plan:       Intermittent diarrhea  -  REFILL   dicyclomine (BENTYL) 10 MG capsule; Take 1 capsule (10 mg total) by mouth 4 (four) times daily before meals and nightly.  Dispense: 120 capsule; Refill: 3  - COMPLETE STOOL STUDIES AS PREVIOUSLY ORDERED:  -     Pancreatic elastase, fecal; Future; Expected date: 10/08/2020  -     Stool Exam-Ova,Cysts,Parasites; Future; Expected date: 10/08/2020  -     Fecal fat, qualitative; Future; Expected date: 10/08/2020  -     Giardia / Cryptosporidum, EIA; Future; Expected date: 10/08/2020  -     Occult blood x 1, stool; Future; Expected date: 10/08/2020  -     pH, stool; Future; Expected date: 10/08/2020  -     Rotavirus antigen, stool; Future; Expected date: 10/08/2020  -     WBC, Stool; Future; Expected date: 10/08/2020  -     Stool culture; Future; Expected date: 10/08/2020  -     Clostridium difficile EIA; Future; Expected date: 10/08/2020  -     Adenovirus Molecular Detection, PCR, Non-Blood Stool; Future; Expected date: 10/08/2020  - recommended increase fiber in diet, especially soluble fiber since this can help bulk up the stool consistency and may help to slow down how fast the stool goes through the colon and can prevent diarrhea; Recommend  high fiber diet (20-30 grams of fiber daily)/OTC fiber supplements daily as directed, such as Benefiber.    History of anxiety  Recommend follow-up with Primary Care Provider/[facility] for continued evaluation and management.  - discussed about trial of librax; patient verbalized understanding but patient declined prescription for librax and wants to continue bentyl    Follow up in about 1 month (around 12/6/2020), or if symptoms worsen or fail to improve.      If no improvement in symptoms or symptoms worsen, call/follow-up at clinic or go to ER.

## 2020-11-06 NOTE — PATIENT INSTRUCTIONS
Uncertain Causes of Diarrhea (Adult)    Diarrhea is when stools are loose and watery. This can be caused by:  · Viral infections  · Bacterial infections  · Food poisoning  · Parasites  · Irritable bowel syndrome (IBS)  · Inflammatory bowel diseases such as ulcerative colitis, Crohn's disease, and celiac disease  · Food intolerance, such as to lactose, the sugar found in milk and milk products  · Reaction to medicines like antibiotics, laxatives, cancer drugs, and antacids  Along with diarrhea, you may also have:  · Abdominal pain and cramping  · Nausea and vomiting  · Loss of bowel control  · Fever and chills  · Bloody stools  In some cases, antibiotics may help to treat diarrhea. You may have a stool sample test. This is done to see what is causing your diarrhea, and if antibiotics will help treat it. The results of a stool sample test may take up to 2 days. The healthcare provider may not give you antibiotics until he or she has the stool test results.  Diarrhea can cause dehydration. This is the loss of too much water and other fluids from the body. When this occurs, body fluid must be replaced. This can be done with oral rehydration solutions. Oral rehydration solutions are available at drugstores and grocery stores without a prescription.  Home care  Follow all instructions given by your healthcare provider. Rest at home for the next 24 hours, or until you feel better. Avoid caffeine, tobacco, and alcohol. These can make diarrhea, cramping, and pain worse.  If taking medicines:  · Dont take over-the-counter diarrhea or nausea medicines unless your healthcare provider tells you to.  · You may use acetaminophen or NSAID medicines like ibuprofen or naproxen to reduce pain and fever. Dont use these if you have chronic liver or kidney disease, or ever had a stomach ulcer or gastrointestinal bleeding. Don't use NSAID medicines if you are already taking one for another condition (like arthritis) or are on daily  aspirin therapy (such as for heart disease or after a stroke). Talk with your healthcare provider first.  · If antibiotics were prescribed, be sure you take them until they are finished. Dont stop taking them even when you feel better. Antibiotics must be taken as a full course.  To prevent the spread of illness:  · Remember that washing with soap and water and using alcohol-based  is the best way to prevent the spread of infection.  · Clean the toilet after each use.  · Wash your hands before eating.  · Wash your hands before and after preparing food. Keep in mind that people with diarrhea or vomiting should not prepare food for others.  · Wash your hands after using cutting boards, countertops, and knives that have been in contact with raw foods.  · Wash and then peel fruits and vegetables.  · Keep uncooked meats away from cooked and ready-to-eat foods.  · Use a food thermometer when cooking. Cook poultry to at least 165°F (74°C). Cook ground meat (beef, veal, pork, lamb) to at least 160°F (71°C). Cook fresh beef, veal, lamb, and pork to at least 145°F (63°C).  · Dont eat raw or undercooked eggs (poached or didi side up), poultry, meat, or unpasteurized milk and juices.  Food and drinks  The main goal while treating vomiting or diarrhea is to prevent dehydration. This is done by taking small amounts of liquids often.  · Keep in mind that liquids are more important than food right now.  · Drink only small amounts of liquids at a time.  · Dont force yourself to eat, especially if you are having cramping, vomiting, or diarrhea. Dont eat large amounts at a time, even if you are hungry.  · If you eat, avoid fatty, greasy, spicy, or fried foods.  · Dont eat dairy foods or drink milk if you have diarrhea. These can make diarrhea worse.  During the first 24 hours you can try:  · Oral rehydration solutions. Do not use sports drinks. They have too much sugar and not enough electrolytes.  · Soft drinks without  caffeine  · Ginger ale  · Water (plain or flavored)  · Decaf tea or coffee  · Clear broth, consommé, or bouillon  · Gelatin, popsicles, or frozen fruit juice bars  The second 24 hours, if you are feeling better, you can add:  · Hot cereal, plain toast, bread, rolls, or crackers  · Plain noodles, rice, mashed potatoes, chicken noodle soup, or rice soup  · Unsweetened canned fruit (no pineapple)  · Bananas  As you recover:  · Limit fat intake to less than 15 grams per day. Dont eat margarine, butter, oils, mayonnaise, sauces, gravies, fried foods, peanut butter, meat, poultry, or fish.  · Limit fiber. Dont eat raw or cooked vegetables, fresh fruits except bananas, or bran cereals.  · Limit caffeine and chocolate.  · Limit dairy.  · Dont use spices or seasonings except salt.  · Go back to your normal diet over time, as you feel better and your symptoms improve.  · If the symptoms come back, go back to a simple diet or clear liquids.  Follow-up care  Follow up with your healthcare provider, or as advised. If a stool sample was taken or cultures were done, call the healthcare provider for the results as instructed.  Call 911  Call 911 if you have any of these symptoms:  · Trouble breathing  · Confusion  · Extreme drowsiness or trouble walking  · Loss of consciousness  · Rapid heart rate  · Chest pain  · Stiff neck  · Seizure  When to seek medical advice  Call your healthcare provider right away if any of these occur:  · Abdominal pain that gets worse  · Constant lower right abdominal pain  · Continued vomiting and inability to keep liquids down  · Diarrhea more than 5 times a day  · Blood in vomit or stool  · Dark urine or no urine for 8 hours, dry mouth and tongue, tiredness, weakness, or dizziness  · Drowsiness  · New rash  · You dont get better in 2 to 3 days  · Fever of 100.4°F (38°C) or higher that doesnt get lower with medicine  Date Last Reviewed: 1/3/2016  © 1022-2366 [employer]. [address] [place] [address]  [place] [address]. All rights reserved. This information is not intended as a substitute for professional medical care. Always follow your healthcare professional's instructions.          Irritable Bowel Syndrome    Irritable bowel syndrome (IBS) is a disorder of the intestines. It is not a disease, but a group of symptoms caused by changes in the way the intestines work. It is fairly common, but the cause is not well understood.  Symptoms of IBS include:  · Abdominal pain, discomfort, and cramping  · Diarrhea  · Constipation or dry, hard stools  · Mucous stool  · Bloating  · Feeling of incomplete bowel movements  It usually results in one of 3 patterns of symptoms:  · Chronic abdominal pain and constipation  · Recurring episodes of diarrhea, with or without pain  · Alternating diarrhea and constipation  Home care  The goal of treatment is to control and relieve your symptoms, so you can lead a full and active life. There is no cure for IBS. But it can be managed.  Diet  Your diet did not cause your IBS, but it can affect it. No one diet works for everyone. Finding the best foods for you may take trial and error. Keep a food log to help find what foods made your symptoms worse. Below are some tips that may help you.  · Eat more slowly. Eat smaller amounts at a time, but more often. Remember, you can always eat more, but cannot eat less once youve eaten too much.  · High-fiber foods are complicated. While they may help relieve constipation, they can make your bloating, cramping, gas, and diarrhea worse.  · Eat less sugar.  · Try cutting out dairy products. Sometimes this helps.  · Try cutting out foods that are high in fat and fatty meats.  · You can control bloating or passing excess gas. Be careful with gassy vegetables and fruits like beans, cabbage, broccoli, and cauliflower.  · Be careful with carbonated beverages and fruit juices. They can make your bloating and diarrhea worse.  · Caffeine,  alcohol, and stimulants can make symptoms worse. These include coffee, tea, sodas, energy drinks, and chocolate.  Lifestyle  · Look for factors that seem to worsen your symptoms. These include stress and emotions.   · Although stress does not cause IBS, it may trigger flare-ups. Counseling can help you learn to handle stress. So can self-help measures like exercise, yoga, and meditation.  · Depression can occur along with IBS. Your healthcare provider may prescribe antidepressant medicine. This may help with diarrhea, constipation, and cramping, as well as with symptoms of depression.  · Smoking doesn't cause IBS, but can make the symptoms worse.  Medicines  Your healthcare provider may prescribe medicines. Take them as directed. For acute flare-ups of your illness, your provider may give you prescription medicines.  · Check with your healthcare provider before taking any medicines for diarrhea.  · Avoid anti-inflammatory medicines like ibuprofen or naproxen.  · Consider nutritional supplements. This is especially true if your diarrhea is prolonged, or you aren't eating or are losing weight  Follow-up care  Follow up with your healthcare provider, or as advised. If a stool sample was taken or cultures were done, you will be told if your treatment needs to change. You can call as directed for the results.  When to seek medical advice  Call your healthcare provider right away if any of these occur:  · Abdominal pain gets worse  · Constant abdominal pain moves to the right-lower abdomen  · You can't keep liquids down because of vomiting  · You have severe diarrhea  · You have blood (red or black color) or mucus in your stool  · You feel very weak or dizzy, faint, or have extreme thirst  · You have a fever of 100.4ºF (38.0ºC) or higher, or as directed by your healthcare provider  Date Last Reviewed: 8/31/2015  © 7702-6637 [employer]. [address] [place] [address], [place] [address]. All rights reserved. This  information is not intended as a substitute for professional medical care. Always follow your healthcare professional's instructions.

## 2021-04-29 ENCOUNTER — PATIENT MESSAGE (OUTPATIENT)
Dept: RESEARCH | Facility: HOSPITAL | Age: 31
End: 2021-04-29

## 2022-01-28 ENCOUNTER — OFFICE VISIT (OUTPATIENT)
Dept: GASTROENTEROLOGY | Facility: CLINIC | Age: 32
End: 2022-01-28
Payer: COMMERCIAL

## 2022-01-28 VITALS — HEIGHT: 66 IN | BODY MASS INDEX: 24.1 KG/M2 | WEIGHT: 149.94 LBS

## 2022-01-28 DIAGNOSIS — Z86.59 HISTORY OF ANXIETY: ICD-10-CM

## 2022-01-28 DIAGNOSIS — R15.2 FECAL URGENCY: ICD-10-CM

## 2022-01-28 DIAGNOSIS — R19.7 INTERMITTENT DIARRHEA: Primary | ICD-10-CM

## 2022-01-28 PROCEDURE — 99999 PR PBB SHADOW E&M-EST. PATIENT-LVL III: ICD-10-PCS | Mod: PBBFAC,,, | Performed by: NURSE PRACTITIONER

## 2022-01-28 PROCEDURE — 99214 OFFICE O/P EST MOD 30 MIN: CPT | Mod: S$GLB,,, | Performed by: NURSE PRACTITIONER

## 2022-01-28 PROCEDURE — 99999 PR PBB SHADOW E&M-EST. PATIENT-LVL III: CPT | Mod: PBBFAC,,, | Performed by: NURSE PRACTITIONER

## 2022-01-28 PROCEDURE — 99214 PR OFFICE/OUTPT VISIT, EST, LEVL IV, 30-39 MIN: ICD-10-PCS | Mod: S$GLB,,, | Performed by: NURSE PRACTITIONER

## 2022-01-28 RX ORDER — CHLORDIAZEPOXIDE HYDROCHLORIDE AND CLIDINIUM BROMIDE 5; 2.5 MG/1; MG/1
1 CAPSULE ORAL EVERY 8 HOURS PRN
Qty: 60 CAPSULE | Refills: 2 | Status: SHIPPED | OUTPATIENT
Start: 2022-01-28 | End: 2022-02-01 | Stop reason: SDUPTHER

## 2022-01-28 NOTE — PATIENT INSTRUCTIONS

## 2022-01-28 NOTE — PROGRESS NOTES
Subjective:       Patient ID: Macario Panchal is a 32 y.o. male Body mass index is 24.2 kg/m².    Chief Complaint: Diarrhea    This patient is established with Dr. Mendoza & myself.    Diarrhea   This is a chronic problem. Episode onset: patient reports he has always had stomach issues since childhood but worse since COVID pandemic ~3/2020, thinks due to increased stress. Episode frequency: bowel movements are 1-4 times a day of formed to loose stools; diarrhea about 50 % of the time; fecal urgency at times. Progression since onset: had improved with taking bentyl but feels it is less effective lately. The patient states that diarrhea does not awaken him from sleep. Associated symptoms include bloating (occasional if he overeats) and increased flatus. Pertinent negatives include no abdominal pain, chills, coughing, fever, vomiting or weight loss. The symptoms are aggravated by stress (eating at restaurant; fried foods). There are no known risk factors (denies recent antibiotic/hospitalization, foreign travel, ill contacts, or suspect food intake). Treatments tried: PAST: probiotic- no relief, bentyl 10 mg QID- significant relief at first but less effective lately. There is no history of bowel resection or inflammatory bowel disease.     Review of Systems   Constitutional: Negative for appetite change, chills, fatigue, fever and weight loss.   HENT: Negative for sore throat and trouble swallowing.    Respiratory: Negative for cough, choking and shortness of breath.    Cardiovascular: Negative for chest pain.   Gastrointestinal: Positive for bloating (occasional if he overeats), diarrhea and flatus. Negative for abdominal pain, anal bleeding, blood in stool, constipation, nausea, rectal pain and vomiting.   Genitourinary: Negative for difficulty urinating and flank pain.   Neurological: Negative for weakness.   Psychiatric/Behavioral: The patient is nervous/anxious (reports rarely takes xanax).        Past Medical  History:   Diagnosis Date    Anxiety      Past Surgical History:   Procedure Laterality Date    COLONOSCOPY N/A 3/25/2019    Procedure: COLONOSCOPY;  Surgeon: Priyank Mendoza MD;  Location: [facility];  Service: Endoscopy;  Laterality: N/A; prep was fair, unremarkable; repeat at 50 years old for screening; biopsy: random biopsies- Colonic mucosa with no significant histopathologic findings.    NASAL SEPTUM SURGERY       Family History   Problem Relation Age of Onset    Anxiety disorder Mother     Anxiety disorder Father     Colon cancer Neg Hx     Colon polyps Neg Hx     Crohn's disease Neg Hx     Ulcerative colitis Neg Hx     Stomach cancer Neg Hx     Esophageal cancer Neg Hx      Social History     Tobacco Use    Smoking status: Never Smoker    Smokeless tobacco: Never Used   Substance Use Topics    Alcohol use: Yes     Comment: occasional- drinks for holidays    Drug use: No     Wt Readings from Last 10 Encounters:   01/28/22 68 kg (149 lb 14.6 oz)   11/06/20 65.9 kg (145 lb 4.5 oz)   10/08/20 66.3 kg (146 lb 2.6 oz)   02/18/19 65.4 kg (144 lb 2.9 oz)   02/05/19 65.3 kg (143 lb 15.4 oz)   07/02/14 63.5 kg (140 lb 1.6 oz)   01/13/14 65.8 kg (145 lb)   11/19/13 64.4 kg (142 lb)   08/20/13 61.7 kg (136 lb)   08/01/12 64 kg (141 lb)     Lab Results   Component Value Date    WBC 10.19 02/05/2019    HGB 16.1 02/05/2019    HCT 46.9 02/05/2019    MCV 81 (L) 02/05/2019     02/05/2019     CMP  Sodium   Date Value Ref Range Status   02/05/2019 138 136 - 145 mmol/L Final     Potassium   Date Value Ref Range Status   02/05/2019 3.5 3.5 - 5.1 mmol/L Final     Chloride   Date Value Ref Range Status   02/05/2019 101 95 - 110 mmol/L Final     CO2   Date Value Ref Range Status   02/05/2019 29 22 - 31 mmol/L Final     Glucose   Date Value Ref Range Status   02/05/2019 104 70 - 110 mg/dL Final     Comment:     The ADA recommends the following guidelines for fasting glucose:  Normal:       less than 100  mg/dL  Prediabetes:  100 mg/dL to 125 mg/dL  Diabetes:     126 mg/dL or higher       BUN   Date Value Ref Range Status   02/05/2019 27 (H) 9 - 21 mg/dL Final     Creatinine   Date Value Ref Range Status   02/05/2019 0.83 0.50 - 1.40 mg/dL Final     Calcium   Date Value Ref Range Status   02/05/2019 10.1 8.4 - 10.2 mg/dL Final     Total Protein   Date Value Ref Range Status   02/05/2019 7.7 6.0 - 8.4 g/dL Final     Albumin   Date Value Ref Range Status   02/05/2019 4.7 3.5 - 5.2 g/dL Final     Total Bilirubin   Date Value Ref Range Status   02/05/2019 0.6 0.2 - 1.3 mg/dL Final     Alkaline Phosphatase   Date Value Ref Range Status   02/05/2019 64 38 - 145 U/L Final     AST   Date Value Ref Range Status   02/05/2019 28 17 - 59 U/L Final     ALT   Date Value Ref Range Status   02/05/2019 44 10 - 44 U/L Final     Anion Gap   Date Value Ref Range Status   02/05/2019 8 8 - 16 mmol/L Final     eGFR if    Date Value Ref Range Status   02/05/2019 >60 >60 mL/min/1.73 m^2 Final     eGFR if non    Date Value Ref Range Status   02/05/2019 >60 >60 mL/min/1.73 m^2 Final     Comment:     Calculation used to obtain the estimated glomerular filtration  rate (eGFR) is the CKD-EPI equation.        Lab Results   Component Value Date    LIPASERES 77 02/05/2019     Lab Results   Component Value Date    TSH 1.34 09/05/2009     Lab Results   Component Value Date    CRP 0.2 02/18/2019     10/8/2020 celiac serology WNL    Reviewed prior medical records including radiology report of 2/5/19 ct abdomen pelvis & endoscopy history (see surgical history).    Objective:      Physical Exam  Vitals and nursing note reviewed.   Constitutional:       General: He is not in acute distress.     Appearance: Normal appearance. He is well-developed. He is not diaphoretic.   HENT:      Mouth/Throat:      Comments: Patient is wearing a face mask, which covers patient's mouth and nose, due to COVID 19 concerns.  Eyes:       General: No scleral icterus.     Conjunctiva/sclera: Conjunctivae normal.      Pupils: Pupils are equal, round, and reactive to light.   Pulmonary:      Effort: Pulmonary effort is normal. No respiratory distress.      Breath sounds: Normal breath sounds. No wheezing.   Abdominal:      General: Bowel sounds are normal. There is no distension or abdominal bruit.      Palpations: Abdomen is soft. Abdomen is not rigid. There is no mass.      Tenderness: There is no abdominal tenderness. There is no guarding or rebound. Negative signs include Tobar's sign and McBurney's sign.   Skin:     General: Skin is warm and dry.      Coloration: Skin is not pale.      Findings: No erythema or rash.      Comments: Non-jaundiced   Neurological:      Mental Status: He is alert and oriented to person, place, and time.   Psychiatric:         Behavior: Behavior normal.         Thought Content: Thought content normal.         Judgment: Judgment normal.         Assessment:       1. Intermittent diarrhea    2. Fecal urgency    3. History of anxiety        Plan:       Intermittent diarrhea & Fecal urgency  -     Pancreatic elastase, fecal; Future; Expected date: 01/28/2022  -     Stool Exam-Ova,Cysts,Parasites; Future; Expected date: 01/28/2022  -     Fecal fat, qualitative; Future; Expected date: 01/28/2022  -     Giardia / Cryptosporidum, EIA; Future; Expected date: 01/28/2022  -     Occult blood x 1, stool; Future; Expected date: 01/28/2022  -     pH, stool; Future; Expected date: 01/28/2022  -     Rotavirus antigen, stool; Future; Expected date: 01/28/2022  -     WBC, Stool; Future; Expected date: 01/28/2022  -     Stool culture; Future; Expected date: 01/28/2022  -     Clostridium difficile EIA; Future; Expected date: 01/28/2022  -     Adenovirus Molecular Detection, PCR, Non-Blood Stool; Future; Expected date: 01/28/2022  - discussed with patient about different treatment options, including but not limited to increasing bentyl dosage  or trying librax; patient verbalized understanding and patient would like to try librax at this time and discontinue bentyl  - DISCONTINUE BENTYL DUE TO ALTERNATE THERAPY  -     START chlordiazepoxide-clidinium 5-2.5 mg (LIBRAX) 5-2.5 mg Cap; Take 1 capsule by mouth every 8 (eight) hours as needed (abdominal pain/spasm).  Dispense: 60 capsule; Refill: 2; advised patient not to take librax and xanax together since librax has similar medication as xanax; patient verbalized understanding    History of anxiety  - Recommend follow-up with Primary Care Provider/[facility] for continued evaluation and management.    Follow up in about 1 month (around 2/28/2022), or if symptoms worsen or fail to improve.      If no improvement in symptoms or symptoms worsen, call/follow-up at clinic or go to ER.        31 minutes of total time spent on the encounter, which includes face to face time and non-face to face time preparing to see the patient (eg, review of tests), Obtaining and/or reviewing separately obtained history, Documenting clinical information in the electronic or other health record, Independently interpreting results (not separately reported) and communicating results to the patient/family/caregiver, or Care coordination (not separately reported).

## 2022-01-31 ENCOUNTER — PATIENT MESSAGE (OUTPATIENT)
Dept: GASTROENTEROLOGY | Facility: CLINIC | Age: 32
End: 2022-01-31
Payer: COMMERCIAL

## 2022-02-01 ENCOUNTER — PATIENT MESSAGE (OUTPATIENT)
Dept: GASTROENTEROLOGY | Facility: CLINIC | Age: 32
End: 2022-02-01
Payer: COMMERCIAL

## 2022-02-01 DIAGNOSIS — R19.7 DIARRHEA, UNSPECIFIED TYPE: ICD-10-CM

## 2022-02-01 RX ORDER — CHLORDIAZEPOXIDE HYDROCHLORIDE AND CLIDINIUM BROMIDE 5; 2.5 MG/1; MG/1
1 CAPSULE ORAL EVERY 8 HOURS PRN
Qty: 60 CAPSULE | Refills: 2 | Status: SHIPPED | OUTPATIENT
Start: 2022-02-01

## 2023-02-14 DIAGNOSIS — M25.531 RIGHT WRIST PAIN: Primary | ICD-10-CM

## 2023-02-17 ENCOUNTER — HOSPITAL ENCOUNTER (OUTPATIENT)
Dept: RADIOLOGY | Facility: HOSPITAL | Age: 33
Discharge: HOME | End: 2023-02-17
Attending: ORTHOPAEDIC SURGERY
Payer: COMMERCIAL

## 2023-02-17 ENCOUNTER — OFFICE VISIT (OUTPATIENT)
Dept: ORTHOPEDICS | Facility: CLINIC | Age: 33
End: 2023-02-17
Payer: COMMERCIAL

## 2023-02-17 VITALS — WEIGHT: 149 LBS | BODY MASS INDEX: 23.95 KG/M2 | HEIGHT: 66 IN

## 2023-02-17 DIAGNOSIS — M25.531 RIGHT WRIST PAIN: ICD-10-CM

## 2023-02-17 DIAGNOSIS — M25.521 RIGHT ELBOW PAIN: ICD-10-CM

## 2023-02-17 DIAGNOSIS — M25.531 RIGHT WRIST PAIN: Primary | ICD-10-CM

## 2023-02-17 DIAGNOSIS — M25.521 RIGHT ELBOW PAIN: Primary | ICD-10-CM

## 2023-02-17 PROCEDURE — 99203 OFFICE O/P NEW LOW 30 MIN: CPT | Mod: 25,S$GLB,, | Performed by: ORTHOPAEDIC SURGERY

## 2023-02-17 PROCEDURE — 73130 XR HAND COMPLETE 3 VIEW RIGHT: ICD-10-PCS | Mod: 26,RT,, | Performed by: RADIOLOGY

## 2023-02-17 PROCEDURE — 73130 X-RAY EXAM OF HAND: CPT | Mod: 26,RT,, | Performed by: RADIOLOGY

## 2023-02-17 PROCEDURE — 73110 XR WRIST COMPLETE 3 VIEWS RIGHT: ICD-10-PCS | Mod: 26,RT,, | Performed by: RADIOLOGY

## 2023-02-17 PROCEDURE — 73080 X-RAY EXAM OF ELBOW: CPT | Mod: TC,PO,RT

## 2023-02-17 PROCEDURE — 73080 XR ELBOW COMPLETE 3 VIEW RIGHT: ICD-10-PCS | Mod: 26,RT,, | Performed by: RADIOLOGY

## 2023-02-17 PROCEDURE — 73110 X-RAY EXAM OF WRIST: CPT | Mod: TC,PO,RT

## 2023-02-17 PROCEDURE — 73110 X-RAY EXAM OF WRIST: CPT | Mod: 26,RT,, | Performed by: RADIOLOGY

## 2023-02-17 PROCEDURE — 97760 PR ORTHOTIC MGMT&TRAINJ INITIAL ENC EA 15 MINS: ICD-10-PCS | Mod: S$GLB,,, | Performed by: ORTHOPAEDIC SURGERY

## 2023-02-17 PROCEDURE — 99999 PR PBB SHADOW E&M-EST. PATIENT-LVL III: ICD-10-PCS | Mod: PBBFAC,,, | Performed by: ORTHOPAEDIC SURGERY

## 2023-02-17 PROCEDURE — 73130 X-RAY EXAM OF HAND: CPT | Mod: TC,PO,RT

## 2023-02-17 PROCEDURE — 73080 X-RAY EXAM OF ELBOW: CPT | Mod: 26,RT,, | Performed by: RADIOLOGY

## 2023-02-17 PROCEDURE — 99999 PR PBB SHADOW E&M-EST. PATIENT-LVL III: CPT | Mod: PBBFAC,,, | Performed by: ORTHOPAEDIC SURGERY

## 2023-02-17 PROCEDURE — 97760 ORTHOTIC MGMT&TRAING 1ST ENC: CPT | Mod: S$GLB,,, | Performed by: ORTHOPAEDIC SURGERY

## 2023-02-17 PROCEDURE — 99203 PR OFFICE/OUTPT VISIT, NEW, LEVL III, 30-44 MIN: ICD-10-PCS | Mod: 25,S$GLB,, | Performed by: ORTHOPAEDIC SURGERY

## 2023-02-17 NOTE — PROGRESS NOTES
33 years old pain in the right wrist and right elbow for about 4-6 months time all the from daily on bad days rest helps full flexion and he has pain in the anterior aspect of the elbow.  Wrist pain is worse with pushing up type activities.  He feels better with wrapping his wrist     Exam shows possibly a small subtle dorsal ganglion of the wrist no signs infection negative Tinel's at the wrist, negative Tinel's at the elbow good strength in all planes no instability nontender throughout no masses detected     X-rays of the wrist are negative, elbow x-rays pending     Assessment:  Right wrist pain possible occult ganglion cyst, right elbow tendinosis     Plan: Right wrist brace, activity modifications symptomatic care, consideration of MRI of the elbow and/or wrist    We performed a custom orthotic/brace fitting, adjusting and training with the patient. The patient demonstrated understanding and proper care. This was performed for 15 minutes.   Imaging studies ordered and reviewed by me    Further History  Aching pain  Worse with activity  Relieved with rest  No other associated symptoms  No other radiation    Further Exam  Alert and oriented  Pleasant  Contralateral limb has appropriate range of motion for age and condition  Contralateral limb has appropriate strength for age and condition  Contralateral limb has appropriate stability  for age and condition  No adenopathy  Pulses are appropriate for current condition  Skin is intact        Chief Complaint    Chief Complaint   Patient presents with    Right Wrist - Pain       HPI  Macario Panchal is a 33 y.o.  male who presents with       Past Medical History  Past Medical History:   Diagnosis Date    Anxiety        Past Surgical History  Past Surgical History:   Procedure Laterality Date    COLONOSCOPY N/A 3/25/2019    Procedure: COLONOSCOPY;  Surgeon: Priyank Mendoza MD;  Location: [facility];  Service: Endoscopy;  Laterality: N/A; prep was fair, unremarkable;  repeat at 50 years old for screening; biopsy: random biopsies- Colonic mucosa with no significant histopathologic findings.    NASAL SEPTUM SURGERY         Medications  Current Outpatient Medications   Medication Sig    alprazolam (XANAX) 0.5 MG tablet Take 1 tablet (0.5 mg total) by mouth nightly as needed.    chlordiazepoxide-clidinium 5-2.5 mg (LIBRAX) 5-2.5 mg Cap Take 1 capsule by mouth every 8 (eight) hours as needed (abdominal pain/spasm).     No current facility-administered medications for this visit.       Allergies  Review of patient's allergies indicates:  No Known Allergies    Family History  Family History   Problem Relation Age of Onset    Anxiety disorder Mother     Anxiety disorder Father     Colon cancer Neg Hx     Colon polyps Neg Hx     Crohn's disease Neg Hx     Ulcerative colitis Neg Hx     Stomach cancer Neg Hx     Esophageal cancer Neg Hx        Social History  Social History     Socioeconomic History    Marital status: Single   Occupational History     Employer: [employer]   Tobacco Use    Smoking status: Never    Smokeless tobacco: Never   Substance and Sexual Activity    Alcohol use: Yes     Comment: occasional- drinks for holidays    Drug use: No    Sexual activity: Yes               Review of Systems     Constitutional: Negative    HENT: Negative  Eyes: Negative  Respiratory: Negative  Cardiovascular: Negative  Musculoskeletal: HPI  Skin: Negative  Neurological: Negative  Hematological: Negative  Endocrine: Negative                 Physical Exam    There were no vitals filed for this visit.  Body mass index is 24.05 kg/m².  Physical Examination:     General appearance -  well appearing, and in no distress  Mental status - awake  Neck - supple  Chest -  symmetric air entry  Heart - normal rate   Abdomen - soft      Assessment     1. Right wrist pain          Plan

## 2023-10-31 ENCOUNTER — OFFICE VISIT (OUTPATIENT)
Dept: GASTROENTEROLOGY | Facility: CLINIC | Age: 33
End: 2023-10-31
Payer: COMMERCIAL

## 2023-10-31 VITALS — WEIGHT: 152.13 LBS | BODY MASS INDEX: 24.45 KG/M2 | HEIGHT: 66 IN

## 2023-10-31 DIAGNOSIS — Z86.59 HISTORY OF ANXIETY: ICD-10-CM

## 2023-10-31 DIAGNOSIS — R19.7 INTERMITTENT DIARRHEA: Primary | ICD-10-CM

## 2023-10-31 DIAGNOSIS — R15.2 FECAL URGENCY: ICD-10-CM

## 2023-10-31 PROCEDURE — 99214 PR OFFICE/OUTPT VISIT, EST, LEVL IV, 30-39 MIN: ICD-10-PCS | Mod: S$GLB,,, | Performed by: NURSE PRACTITIONER

## 2023-10-31 PROCEDURE — 99214 OFFICE O/P EST MOD 30 MIN: CPT | Mod: S$GLB,,, | Performed by: NURSE PRACTITIONER

## 2023-10-31 PROCEDURE — 99999 PR PBB SHADOW E&M-EST. PATIENT-LVL III: CPT | Mod: PBBFAC,,, | Performed by: NURSE PRACTITIONER

## 2023-10-31 PROCEDURE — 99999 PR PBB SHADOW E&M-EST. PATIENT-LVL III: ICD-10-PCS | Mod: PBBFAC,,, | Performed by: NURSE PRACTITIONER

## 2023-10-31 RX ORDER — DICYCLOMINE HYDROCHLORIDE 10 MG/1
10 CAPSULE ORAL
Qty: 120 CAPSULE | Refills: 0 | Status: SHIPPED | OUTPATIENT
Start: 2023-10-31 | End: 2024-10-30

## 2023-10-31 NOTE — PATIENT INSTRUCTIONS
Uncertain Causes of Diarrhea (Adult)    Diarrhea is when stools are loose and watery. This can be caused by:  Viral infections  Bacterial infections  Food poisoning  Parasites  Irritable bowel syndrome (IBS)  Inflammatory bowel diseases such as ulcerative colitis, Crohn's disease, and celiac disease  Food intolerance, such as to lactose, the sugar found in milk and milk products  Reaction to medicines like antibiotics, laxatives, cancer drugs, and antacids  Along with diarrhea, you may also have:  Abdominal pain and cramping  Nausea and vomiting  Loss of bowel control  Fever and chills  Bloody stools  In some cases, antibiotics may help to treat diarrhea. You may have a stool sample test. This is done to see what is causing your diarrhea, and if antibiotics will help treat it. The results of a stool sample test may take up to 2 days. The healthcare provider may not give you antibiotics until he or she has the stool test results.  Diarrhea can cause dehydration. This is the loss of too much water and other fluids from the body. When this occurs, body fluid must be replaced. This can be done with oral rehydration solutions. Oral rehydration solutions are available at drugstores and grocery stores without a prescription.  Home care  Follow all instructions given by your healthcare provider. Rest at home for the next 24 hours, or until you feel better. Avoid caffeine, tobacco, and alcohol. These can make diarrhea, cramping, and pain worse.  If taking medicines:  Dont take over-the-counter diarrhea or nausea medicines unless your healthcare provider tells you to.  You may use acetaminophen or NSAID medicines like ibuprofen or naproxen to reduce pain and fever. Dont use these if you have chronic liver or kidney disease, or ever had a stomach ulcer or gastrointestinal bleeding. Don't use NSAID medicines if you are already taking one for another condition (like arthritis) or are on daily aspirin therapy (such as for heart  disease or after a stroke). Talk with your healthcare provider first.  If antibiotics were prescribed, be sure you take them until they are finished. Dont stop taking them even when you feel better. Antibiotics must be taken as a full course.  To prevent the spread of illness:  Remember that washing with soap and water and using alcohol-based  is the best way to prevent the spread of infection.  Clean the toilet after each use.  Wash your hands before eating.  Wash your hands before and after preparing food. Keep in mind that people with diarrhea or vomiting should not prepare food for others.  Wash your hands after using cutting boards, countertops, and knives that have been in contact with raw foods.  Wash and then peel fruits and vegetables.  Keep uncooked meats away from cooked and ready-to-eat foods.  Use a food thermometer when cooking. Cook poultry to at least 165°F (74°C). Cook ground meat (beef, veal, pork, lamb) to at least 160°F (71°C). Cook fresh beef, veal, lamb, and pork to at least 145°F (63°C).  Dont eat raw or undercooked eggs (poached or didi side up), poultry, meat, or unpasteurized milk and juices.  Food and drinks  The main goal while treating vomiting or diarrhea is to prevent dehydration. This is done by taking small amounts of liquids often.  Keep in mind that liquids are more important than food right now.  Drink only small amounts of liquids at a time.  Dont force yourself to eat, especially if you are having cramping, vomiting, or diarrhea. Dont eat large amounts at a time, even if you are hungry.  If you eat, avoid fatty, greasy, spicy, or fried foods.  Dont eat dairy foods or drink milk if you have diarrhea. These can make diarrhea worse.  During the first 24 hours you can try:  Oral rehydration solutions. Do not use sports drinks. They have too much sugar and not enough electrolytes.  Soft drinks without caffeine  Ginger ale  Water (plain or flavored)  Decaf tea or  coffee  Clear broth, consommé, or bouillon  Gelatin, popsicles, or frozen fruit juice bars  The second 24 hours, if you are feeling better, you can add:  Hot cereal, plain toast, bread, rolls, or crackers  Plain noodles, rice, mashed potatoes, chicken noodle soup, or rice soup  Unsweetened canned fruit (no pineapple)  Bananas  As you recover:  Limit fat intake to less than 15 grams per day. Dont eat margarine, butter, oils, mayonnaise, sauces, gravies, fried foods, peanut butter, meat, poultry, or fish.  Limit fiber. Dont eat raw or cooked vegetables, fresh fruits except bananas, or bran cereals.  Limit caffeine and chocolate.  Limit dairy.  Dont use spices or seasonings except salt.  Go back to your normal diet over time, as you feel better and your symptoms improve.  If the symptoms come back, go back to a simple diet or clear liquids.  Follow-up care  Follow up with your healthcare provider, or as advised. If a stool sample was taken or cultures were done, call the healthcare provider for the results as instructed.  Call 911  Call 911 if you have any of these symptoms:  Trouble breathing  Confusion  Extreme drowsiness or trouble walking  Loss of consciousness  Rapid heart rate  Chest pain  Stiff neck  Seizure  When to seek medical advice  Call your healthcare provider right away if any of these occur:  Abdominal pain that gets worse  Constant lower right abdominal pain  Continued vomiting and inability to keep liquids down  Diarrhea more than 5 times a day  Blood in vomit or stool  Dark urine or no urine for 8 hours, dry mouth and tongue, tiredness, weakness, or dizziness  Drowsiness  New rash  You dont get better in 2 to 3 days  Fever of 100.4°F (38°C) or higher that doesnt get lower with medicine  Date Last Reviewed: 1/3/2016  © 1185-3553 [employer] [address] [place] [address], [place] [address]. All rights reserved. This information is not intended as a substitute for professional medical care.  Always follow your healthcare professional's instructions.         Irritable Bowel Syndrome    Irritable bowel syndrome (IBS) is a disorder of the intestines. It is not a disease, but a group of symptoms caused by changes in the way the intestines work. It is fairly common, but the cause is not well understood.  Symptoms of IBS include:  Abdominal pain, discomfort, and cramping  Diarrhea  Constipation or dry, hard stools  Mucous stool  Bloating  Feeling of incomplete bowel movements  It usually results in one of 3 patterns of symptoms:  Chronic abdominal pain and constipation  Recurring episodes of diarrhea, with or without pain  Alternating diarrhea and constipation  Home care  The goal of treatment is to control and relieve your symptoms, so you can lead a full and active life. There is no cure for IBS. But it can be managed.  Diet  Your diet did not cause your IBS, but it can affect it. No one diet works for everyone. Finding the best foods for you may take trial and error. Keep a food log to help find what foods made your symptoms worse. Below are some tips that may help you.  Eat more slowly. Eat smaller amounts at a time, but more often. Remember, you can always eat more, but cannot eat less once youve eaten too much.  High-fiber foods are complicated. While they may help relieve constipation, they can make your bloating, cramping, gas, and diarrhea worse.  Eat less sugar.  Try cutting out dairy products. Sometimes this helps.  Try cutting out foods that are high in fat and fatty meats.  You can control bloating or passing excess gas. Be careful with gassy vegetables and fruits like beans, cabbage, broccoli, and cauliflower.  Be careful with carbonated beverages and fruit juices. They can make your bloating and diarrhea worse.  Caffeine, alcohol, and stimulants can make symptoms worse. These include coffee, tea, sodas, energy drinks, and chocolate.  Lifestyle  Look for factors that seem to worsen your  symptoms. These include stress and emotions.   Although stress does not cause IBS, it may trigger flare-ups. Counseling can help you learn to handle stress. So can self-help measures like exercise, yoga, and meditation.  Depression can occur along with IBS. Your healthcare provider may prescribe antidepressant medicine. This may help with diarrhea, constipation, and cramping, as well as with symptoms of depression.  Smoking doesn't cause IBS, but can make the symptoms worse.  Medicines  Your healthcare provider may prescribe medicines. Take them as directed. For acute flare-ups of your illness, your provider may give you prescription medicines.  Check with your healthcare provider before taking any medicines for diarrhea.  Avoid anti-inflammatory medicines like ibuprofen or naproxen.  Consider nutritional supplements. This is especially true if your diarrhea is prolonged, or you aren't eating or are losing weight  Follow-up care  Follow up with your healthcare provider, or as advised. If a stool sample was taken or cultures were done, you will be told if your treatment needs to change. You can call as directed for the results.  When to seek medical advice  Call your healthcare provider right away if any of these occur:  Abdominal pain gets worse  Constant abdominal pain moves to the right-lower abdomen  You can't keep liquids down because of vomiting  You have severe diarrhea  You have blood (red or black color) or mucus in your stool  You feel very weak or dizzy, faint, or have extreme thirst  You have a fever of 100.4ºF (38.0ºC) or higher, or as directed by your healthcare provider  Date Last Reviewed: 8/31/2015  © 0364-0512 [employer]. [address] [place] [address], [place] [address]. All rights reserved. This information is not intended as a substitute for professional medical care. Always follow your healthcare professional's instructions.

## 2023-10-31 NOTE — PROGRESS NOTES
Subjective:       Patient ID: Macario Panchal is a 33 y.o. male Body mass index is 24.55 kg/m².    Chief Complaint: med refills    This patient is established with Dr. Mendoza & myself.    Patient did not complete stool studies as previously ordered at our last visit. Patient not taking anything for his GI symptoms currently. Reports he does not remember taking librax.    Diarrhea   This is a chronic problem. Episode onset: patient reports he has always had stomach issues since childhood but worse since COVID pandemic ~3/2020, thinks due to increased stress. Episode frequency: bowel movements are 1-4 times a day of formed to loose stools; diarrhea about 50% of the time; fecal urgency at times. The problem has been unchanged. The patient states that diarrhea does not awaken him from sleep. Associated symptoms include bloating (occasional if he overeats) and increased flatus. Pertinent negatives include no abdominal pain, chills, coughing, fever, vomiting or weight loss. The symptoms are aggravated by stress (eating at restaurant; fried foods). There are no known risk factors (denies recent antibiotic/hospitalization, foreign travel, ill contacts, or suspect food intake). Treatments tried: PAST: probiotic- no relief, bentyl 10 mg QID- significant relief at first but had became less effective, but has not taken it lately. There is no history of bowel resection or inflammatory bowel disease.     Review of Systems   Constitutional:  Negative for appetite change, chills, fatigue, fever and weight loss.   HENT:  Negative for sore throat and trouble swallowing.    Respiratory:  Negative for cough, choking and shortness of breath.    Cardiovascular:  Negative for chest pain.   Gastrointestinal:  Positive for bloating (occasional if he overeats), diarrhea and flatus. Negative for abdominal pain, anal bleeding, blood in stool, constipation, nausea, rectal pain and vomiting.   Genitourinary:  Negative for difficulty urinating and  flank pain.   Neurological:  Negative for weakness.   Psychiatric/Behavioral:  The patient is nervous/anxious (reports rarely takes xanax).        Past Medical History:   Diagnosis Date    Anxiety      Past Surgical History:   Procedure Laterality Date    COLONOSCOPY N/A 3/25/2019    Procedure: COLONOSCOPY;  Surgeon: Priyank Mendoza MD;  Location: [facility];  Service: Endoscopy;  Laterality: N/A; prep was fair, unremarkable; repeat at 50 years old for screening; biopsy: random biopsies- Colonic mucosa with no significant histopathologic findings.    NASAL SEPTUM SURGERY       Family History   Problem Relation Age of Onset    Anxiety disorder Mother     Anxiety disorder Father     Colon cancer Neg Hx     Colon polyps Neg Hx     Crohn's disease Neg Hx     Ulcerative colitis Neg Hx     Stomach cancer Neg Hx     Esophageal cancer Neg Hx      Social History     Tobacco Use    Smoking status: Never    Smokeless tobacco: Never   Substance Use Topics    Alcohol use: Yes     Comment: occasional- drinks for holidays    Drug use: No     Wt Readings from Last 10 Encounters:   10/31/23 69 kg (152 lb 1.9 oz)   02/17/23 67.6 kg (149 lb)   01/28/22 68 kg (149 lb 14.6 oz)   11/06/20 65.9 kg (145 lb 4.5 oz)   10/08/20 66.3 kg (146 lb 2.6 oz)   02/18/19 65.4 kg (144 lb 2.9 oz)   02/05/19 65.3 kg (143 lb 15.4 oz)   07/02/14 63.5 kg (140 lb 1.6 oz)   01/13/14 65.8 kg (145 lb)   11/19/13 64.4 kg (142 lb)     Lab Results   Component Value Date    WBC 10.19 02/05/2019    HGB 16.1 02/05/2019    HCT 46.9 02/05/2019    MCV 81 (L) 02/05/2019     02/05/2019     CMP  Sodium   Date Value Ref Range Status   02/05/2019 138 136 - 145 mmol/L Final     Potassium   Date Value Ref Range Status   02/05/2019 3.5 3.5 - 5.1 mmol/L Final     Chloride   Date Value Ref Range Status   02/05/2019 101 95 - 110 mmol/L Final     CO2   Date Value Ref Range Status   02/05/2019 29 22 - 31 mmol/L Final     Glucose   Date Value Ref Range Status   02/05/2019  104 70 - 110 mg/dL Final     Comment:     The ADA recommends the following guidelines for fasting glucose:  Normal:       less than 100 mg/dL  Prediabetes:  100 mg/dL to 125 mg/dL  Diabetes:     126 mg/dL or higher       BUN   Date Value Ref Range Status   02/05/2019 27 (H) 9 - 21 mg/dL Final     Creatinine   Date Value Ref Range Status   02/05/2019 0.83 0.50 - 1.40 mg/dL Final     Calcium   Date Value Ref Range Status   02/05/2019 10.1 8.4 - 10.2 mg/dL Final     Total Protein   Date Value Ref Range Status   02/05/2019 7.7 6.0 - 8.4 g/dL Final     Albumin   Date Value Ref Range Status   02/05/2019 4.7 3.5 - 5.2 g/dL Final     Total Bilirubin   Date Value Ref Range Status   02/05/2019 0.6 0.2 - 1.3 mg/dL Final     Alkaline Phosphatase   Date Value Ref Range Status   02/05/2019 64 38 - 145 U/L Final     AST   Date Value Ref Range Status   02/05/2019 28 17 - 59 U/L Final     ALT   Date Value Ref Range Status   02/05/2019 44 10 - 44 U/L Final     Anion Gap   Date Value Ref Range Status   02/05/2019 8 8 - 16 mmol/L Final     eGFR if    Date Value Ref Range Status   02/05/2019 >60 >60 mL/min/1.73 m^2 Final     eGFR if non    Date Value Ref Range Status   02/05/2019 >60 >60 mL/min/1.73 m^2 Final     Comment:     Calculation used to obtain the estimated glomerular filtration  rate (eGFR) is the CKD-EPI equation.        Lab Results   Component Value Date    LIPASERES 77 02/05/2019     Lab Results   Component Value Date    TSH 1.34 09/05/2009     Lab Results   Component Value Date    CRP 0.2 02/18/2019     10/8/2020 celiac serology WNL    Reviewed prior medical records including radiology report of 2/5/2019 CT abdomen pelvis & endoscopy history (see surgical history).    Objective:      Physical Exam  Vitals and nursing note reviewed.   Constitutional:       General: He is not in acute distress.     Appearance: Normal appearance. He is well-developed. He is not diaphoretic.   HENT:       Mouth/Throat:      Lips: Pink. No lesions.      Mouth: Mucous membranes are moist. No oral lesions.      Tongue: No lesions.      Pharynx: Oropharynx is clear. No pharyngeal swelling or posterior oropharyngeal erythema.   Eyes:      General: No scleral icterus.     Conjunctiva/sclera: Conjunctivae normal.   Pulmonary:      Effort: Pulmonary effort is normal. No respiratory distress.      Breath sounds: Normal breath sounds. No wheezing.   Abdominal:      General: Bowel sounds are normal. There is no distension or abdominal bruit.      Palpations: Abdomen is soft. Abdomen is not rigid. There is no mass.      Tenderness: There is no abdominal tenderness. There is no guarding or rebound. Negative signs include Tobar's sign and McBurney's sign.   Skin:     General: Skin is warm and dry.      Coloration: Skin is not jaundiced or pale.      Findings: No erythema or rash.   Neurological:      Mental Status: He is alert and oriented to person, place, and time.   Psychiatric:         Behavior: Behavior normal.         Thought Content: Thought content normal.         Judgment: Judgment normal.         Assessment:       1. Intermittent diarrhea    2. Fecal urgency    3. History of anxiety          Plan:       Intermittent diarrhea & Fecal urgency  -     Stool Exam-Ova,Cysts,Parasites; Future; Expected date: 10/31/2023  -     Fecal fat, qualitative; Future; Expected date: 10/31/2023  -     Giardia / Cryptosporidum, EIA; Future; Expected date: 10/31/2023  -     pH, stool; Future; Expected date: 10/31/2023  -     Rotavirus antigen, stool; Future; Expected date: 10/31/2023  -     WBC, Stool; Future; Expected date: 10/31/2023  -     Stool culture; Future; Expected date: 10/31/2023  -     Clostridium difficile EIA; Future; Expected date: 10/31/2023  -     Adenovirus Molecular Detection, PCR, Non-Blood Stool; Future; Expected date: 10/31/2023  -  RESTART   dicyclomine (BENTYL) 10 MG capsule; Take 1 capsule (10 mg total) by mouth  before meals and at bedtime as needed (abdominal cramping/pain).  Dispense: 120 capsule; Refill: 0; informed patient can try 20 mg dosage or can try librax if bentyl 10 mg not effective  - recommend increase fiber in diet, especially soluble fiber since this can help bulk up the stool consistency and may help to slow down how fast the stool goes through the colon and can prevent diarrhea; Recommend high fiber diet (20-30 grams of fiber daily)/OTC fiber supplements daily as directed, such as Benefiber or Metamucil.    History of anxiety  - Recommend follow-up with Primary Care Provider/[facility] for continued evaluation and management.    Follow up in about 2 months (around 12/31/2023), or if symptoms worsen or fail to improve.      If no improvement in symptoms or symptoms worsen, call/follow-up at clinic or go to ER.        18 minutes of total time spent on the encounter, which includes face to face time and non-face to face time preparing to see the patient (eg, review of tests), Obtaining and/or reviewing separately obtained history, Documenting clinical information in the electronic or other health record, Independently interpreting results (not separately reported) and communicating results to the patient/family/caregiver, or Care coordination (not separately reported).

## 2024-02-28 ENCOUNTER — HOSPITAL ENCOUNTER (OUTPATIENT)
Dept: RADIOLOGY | Facility: HOSPITAL | Age: 34
Discharge: HOME | End: 2024-02-28
Attending: ORTHOPAEDIC SURGERY
Payer: COMMERCIAL

## 2024-02-28 ENCOUNTER — OFFICE VISIT (OUTPATIENT)
Dept: ORTHOPEDICS | Facility: CLINIC | Age: 34
End: 2024-02-28
Payer: COMMERCIAL

## 2024-02-28 VITALS — WEIGHT: 152 LBS | HEIGHT: 66 IN | BODY MASS INDEX: 24.43 KG/M2

## 2024-02-28 DIAGNOSIS — M25.531 RIGHT WRIST PAIN: ICD-10-CM

## 2024-02-28 DIAGNOSIS — S66.911A STRAIN OF RIGHT WRIST, INITIAL ENCOUNTER: ICD-10-CM

## 2024-02-28 DIAGNOSIS — M25.531 RIGHT WRIST PAIN: Primary | ICD-10-CM

## 2024-02-28 PROCEDURE — 99999 PR PBB SHADOW E&M-EST. PATIENT-LVL II: CPT | Mod: PBBFAC,,, | Performed by: ORTHOPAEDIC SURGERY

## 2024-02-28 PROCEDURE — 73110 X-RAY EXAM OF WRIST: CPT | Mod: TC,PO,RT

## 2024-02-28 PROCEDURE — 3008F BODY MASS INDEX DOCD: CPT | Mod: CPTII,S$GLB,, | Performed by: ORTHOPAEDIC SURGERY

## 2024-02-28 PROCEDURE — 99204 OFFICE O/P NEW MOD 45 MIN: CPT | Mod: S$GLB,,, | Performed by: ORTHOPAEDIC SURGERY

## 2024-02-28 PROCEDURE — 73110 X-RAY EXAM OF WRIST: CPT | Mod: 26,RT,, | Performed by: RADIOLOGY

## 2024-02-28 RX ORDER — MELOXICAM 15 MG/1
15 TABLET ORAL DAILY
Qty: 30 TABLET | Refills: 0 | Status: SHIPPED | OUTPATIENT
Start: 2024-02-28

## 2024-02-28 NOTE — PROGRESS NOTES
2/28/2024    Chief Complaint:  Chief Complaint   Patient presents with    Right Wrist - Pain       HPI:  Macario Panchal is a 34 y.o. male, who presents to clinic today has a history of right wrist pain.  This has been going on for a couple of years.  He did not have any specific injury prior to its onset.  States that he has noticed a bump develop over the back of the wrist.  He states that any time he was loading his wrist heavily he feels pain.  He has not had any treatment or evaluation.  He is here today for an assessment.    PMHX:  Past Medical History:   Diagnosis Date    Anxiety        PSHX:  Past Surgical History:   Procedure Laterality Date    COLONOSCOPY N/A 3/25/2019    Procedure: COLONOSCOPY;  Surgeon: Priyank Mendoza MD;  Location: [facility];  Service: Endoscopy;  Laterality: N/A; prep was fair, unremarkable; repeat at 50 years old for screening; biopsy: random biopsies- Colonic mucosa with no significant histopathologic findings.    NASAL SEPTUM SURGERY         FMHX:  Family History   Problem Relation Age of Onset    Anxiety disorder Mother     Anxiety disorder Father     Colon cancer Neg Hx     Colon polyps Neg Hx     Crohn's disease Neg Hx     Ulcerative colitis Neg Hx     Stomach cancer Neg Hx     Esophageal cancer Neg Hx        SOCHX:  Social History     Tobacco Use    Smoking status: Never    Smokeless tobacco: Never   Substance Use Topics    Alcohol use: Yes     Comment: occasional- drinks for holidays       ALLERGIES:  Patient has no known allergies.    CURRENT MEDICATIONS:  Current Outpatient Medications on File Prior to Visit   Medication Sig Dispense Refill    alprazolam (XANAX) 0.5 MG tablet Take 1 tablet (0.5 mg total) by mouth nightly as needed. 30 tablet 3    chlordiazepoxide-clidinium 5-2.5 mg (LIBRAX) 5-2.5 mg Cap Take 1 capsule by mouth every 8 (eight) hours as needed (abdominal pain/spasm). (Patient not taking: Reported on 10/31/2023) 60 capsule 2    dicyclomine (BENTYL) 10 MG  "capsule Take 1 capsule (10 mg total) by mouth before meals and at bedtime as needed (abdominal cramping/pain). 120 capsule 0     No current facility-administered medications on file prior to visit.       REVIEW OF SYSTEMS:  Review of Systems   Constitutional: Negative.    HENT: Negative.     Eyes: Negative.    Respiratory: Negative.     Cardiovascular: Negative.    Gastrointestinal: Negative.    Genitourinary: Negative.    Musculoskeletal:  Positive for joint pain.   Skin: Negative.    Neurological: Negative.    Endo/Heme/Allergies: Negative.    Psychiatric/Behavioral: Negative.       GENERAL PHYSICAL EXAM:   Ht 5' 6" (1.676 m)   Wt 68.9 kg (152 lb)   BMI 24.53 kg/m²    GEN: well developed, well nourished, no acute distress   HENT: Normocephalic, atraumatic   EYES: No discharge, conjunctiva normal   NECK: Supple, non-tender   PULM: No wheezing, no respiratory distress   CV: RRR   ABD: Soft, non-tender    ORTHO EXAM:   Examination of the right wrist and hand reveals that there is no edema.  There are no major skin changes.  Palpation reveals tenderness directly over the region of the lunate.  There is no tenderness in the snuffbox or over the TFCC.  There is no instability on Metzger's testing.  He has no midcarpal instability.  Wrist range of motion is extension of 80° and flexion of 70°.  He is able to pronate and supinate.  He does have a 2+ radial pulse    RADIOLOGY:   X-rays of the right wrist were taken in clinic today there are no fractures dislocations or other pathology noted    ASSESSMENT:   Right wrist strain    PLAN:  1. I will start him on Mobic 15 mg per day    2. Will provide him with a Velcro wrist brace to wear for all of his heavy activities     3. We have discussed the possibility of therapy    4.  Will follow up with me in 6-8 weeks for repeat evaluation    "